# Patient Record
Sex: MALE | Race: WHITE | NOT HISPANIC OR LATINO | Employment: OTHER | URBAN - METROPOLITAN AREA
[De-identification: names, ages, dates, MRNs, and addresses within clinical notes are randomized per-mention and may not be internally consistent; named-entity substitution may affect disease eponyms.]

---

## 2017-02-18 DIAGNOSIS — I10 HYPERTENSION GOAL BP (BLOOD PRESSURE) < 140/90: ICD-10-CM

## 2017-02-20 NOTE — TELEPHONE ENCOUNTER
Lisinopril  Last Written Prescription Date: 12/5/16  Last Fill Quantity: 90, # refills: 0  Last Office Visit with [facility], [facility] or [facility] prescribing provider: 11/14/16 Dr. Brunson         Potassium   Date Value Ref Range Status   11/10/2014 4.4 3.4 - 5.3 mmol/L Final     Creatinine   Date Value Ref Range Status   11/10/2014 1.10 0.66 - 1.25 mg/dL Final     BP Readings from Last 3 Encounters:   11/14/16 138/80   07/29/15 137/75   12/17/14 128/80     SHAMA Eric (R)

## 2017-02-21 RX ORDER — LISINOPRIL 10 MG/1
TABLET ORAL
Qty: 90 TABLET | Refills: 0 | Status: SHIPPED | OUTPATIENT
Start: 2017-02-21 | End: 2017-05-24

## 2017-03-24 ENCOUNTER — TRANSFERRED RECORDS (OUTPATIENT)
Dept: HEALTH INFORMATION MANAGEMENT | Facility: CLINIC | Age: 67
End: 2017-03-24

## 2017-03-24 LAB
ALT SERPL-CCNC: 23 U/L (ref 9–46)
AST SERPL-CCNC: 20 U/L (ref 10–35)
CREAT SERPL-MCNC: 1.18 MG/DL (ref 0.7–1.25)
GFR SERPL CREATININE-BSD FRML MDRD: 65 ML/MIN/1.73M2
GLUCOSE SERPL-MCNC: 100 MG/DL (ref 65–99)
POTASSIUM SERPL-SCNC: 4.5 MMOL/L (ref 3.5–5.3)

## 2017-05-24 DIAGNOSIS — I10 HYPERTENSION GOAL BP (BLOOD PRESSURE) < 140/90: ICD-10-CM

## 2017-05-24 NOTE — TELEPHONE ENCOUNTER
lisinopril (PRINIVIL/ZESTRIL) 10 MG tablet      Last Written Prescription Date: 2/21/17  Last Fill Quantity: 90, # refills: 0  Last Office Visit with [facility], [facility] or [facility] prescribing provider: 11/14/16 Dr. Brunson         Potassium   Date Value Ref Range Status   11/10/2014 4.4 3.4 - 5.3 mmol/L Final     Creatinine   Date Value Ref Range Status   11/10/2014 1.10 0.66 - 1.25 mg/dL Final     BP Readings from Last 3 Encounters:   11/14/16 138/80   07/29/15 137/75   12/17/14 128/80

## 2017-05-26 RX ORDER — LISINOPRIL 10 MG/1
TABLET ORAL
Qty: 90 TABLET | Refills: 0 | Status: SHIPPED | OUTPATIENT
Start: 2017-05-26 | End: 2017-08-25

## 2017-05-26 NOTE — TELEPHONE ENCOUNTER
Routing refill request to provider for review/approval because:  Labs not current:  KYANNA RN

## 2017-05-26 NOTE — TELEPHONE ENCOUNTER
Call patient re:  He was going to forward some labs from his endocrinologist for our records. Please remind him to send.  ELVIA

## 2017-05-30 NOTE — TELEPHONE ENCOUNTER
This writer attempted to contact Cody on 05/30/17    Reason for call relay message and left message to return call. Cell # is [phone]    When patient calls back, please Relay message below from Dr. Brunson, (read verbatim) .        Dolores Oneil MA

## 2017-05-31 NOTE — TELEPHONE ENCOUNTER
This writer attempted to contact Cody on 05/31/17    Reason for call relay message and left message to return call.    When patient calls back, please Relay message from Dr. Brunson, (read verbatim) .        Dolores Oneil MA

## 2017-06-01 NOTE — TELEPHONE ENCOUNTER
LM for pt to call clinic.  3rd attempt, with no response.  Please advise.      Dolores REED, Patient Care

## 2017-08-25 DIAGNOSIS — I10 HYPERTENSION GOAL BP (BLOOD PRESSURE) < 140/90: ICD-10-CM

## 2017-08-25 NOTE — TELEPHONE ENCOUNTER
lisinopril (PRINIVIL/ZESTRIL) 10 MG tablet      Last Written Prescription Date: 5/26/17  Last Fill Quantity: 90, # refills: 0  Last Office Visit with [facility], [facility] or [facility] prescribing provider: 11/14/16 Dr. Brunson       Potassium   Date Value Ref Range Status   11/10/2014 4.4 3.4 - 5.3 mmol/L Final     Creatinine   Date Value Ref Range Status   11/10/2014 1.10 0.66 - 1.25 mg/dL Final     BP Readings from Last 3 Encounters:   11/14/16 138/80   07/29/15 137/75   12/17/14 128/80

## 2017-08-29 RX ORDER — LISINOPRIL 10 MG/1
TABLET ORAL
Qty: 30 TABLET | Refills: 0 | Status: SHIPPED | OUTPATIENT
Start: 2017-08-29 | End: 2017-10-03

## 2017-08-29 NOTE — TELEPHONE ENCOUNTER
Routing refill request to provider for review/approval because:  Kasey given x1 and patient did not follow up, please advise  Labs not current:  BMP    Loren Christianson, RN, BSN

## 2017-10-03 DIAGNOSIS — I10 HYPERTENSION GOAL BP (BLOOD PRESSURE) < 140/90: ICD-10-CM

## 2017-10-03 NOTE — TELEPHONE ENCOUNTER
lisinopril (PRINIVIL/ZESTRIL) 10 MG tablet      Last Written Prescription Date: 08/29/17  Last Fill Quantity: 30, # refills: 0  Last Office Visit with [facility] prescribing provider: 11/14/16 Dr. Brunson  Next 5 appointments (look out 90 days)     Oct 17, 2017  7:00 AM CDT   PHYSICAL with Martha Brunson MD   [facility]    [address] [place] [address] [phone]                   Potassium   Date Value Ref Range Status   11/10/2014 4.4 3.4 - 5.3 mmol/L Final     Creatinine   Date Value Ref Range Status   11/10/2014 1.10 0.66 - 1.25 mg/dL Final     BP Readings from Last 3 Encounters:   11/14/16 138/80   07/29/15 137/75   12/17/14 128/80

## 2017-10-05 RX ORDER — LISINOPRIL 10 MG/1
TABLET ORAL
Qty: 30 TABLET | Refills: 0 | Status: SHIPPED | OUTPATIENT
Start: 2017-10-05 | End: 2017-10-17

## 2017-10-17 ENCOUNTER — OFFICE VISIT (OUTPATIENT)
Dept: FAMILY MEDICINE | Facility: CLINIC | Age: 67
End: 2017-10-17
Payer: COMMERCIAL

## 2017-10-17 VITALS
TEMPERATURE: 98.3 F | HEART RATE: 79 BPM | HEIGHT: 71 IN | OXYGEN SATURATION: 93 % | WEIGHT: 232.2 LBS | BODY MASS INDEX: 32.51 KG/M2 | SYSTOLIC BLOOD PRESSURE: 134 MMHG | DIASTOLIC BLOOD PRESSURE: 68 MMHG

## 2017-10-17 DIAGNOSIS — Z87.891 FORMER SMOKER: ICD-10-CM

## 2017-10-17 DIAGNOSIS — R73.01 ELEVATED FASTING BLOOD SUGAR: ICD-10-CM

## 2017-10-17 DIAGNOSIS — Z00.00 ROUTINE GENERAL MEDICAL EXAMINATION AT A HEALTH CARE FACILITY: Primary | ICD-10-CM

## 2017-10-17 DIAGNOSIS — Z12.11 SCREEN FOR COLON CANCER: ICD-10-CM

## 2017-10-17 DIAGNOSIS — J43.9 PULMONARY EMPHYSEMA, UNSPECIFIED EMPHYSEMA TYPE (H): ICD-10-CM

## 2017-10-17 DIAGNOSIS — I10 HYPERTENSION GOAL BP (BLOOD PRESSURE) < 140/90: ICD-10-CM

## 2017-10-17 DIAGNOSIS — R79.81 BORDERLINE LOW OXYGEN SATURATION LEVEL: ICD-10-CM

## 2017-10-17 LAB
FEF 25/75: NORMAL
FEV-1: NORMAL
FEV1/FVC: NORMAL
FVC: NORMAL

## 2017-10-17 PROCEDURE — 94010 BREATHING CAPACITY TEST: CPT | Performed by: FAMILY MEDICINE

## 2017-10-17 PROCEDURE — 99396 PREV VISIT EST AGE 40-64: CPT | Mod: 25 | Performed by: FAMILY MEDICINE

## 2017-10-17 RX ORDER — LISINOPRIL 10 MG/1
TABLET ORAL
Qty: 90 TABLET | Refills: 3 | Status: SHIPPED | OUTPATIENT
Start: 2017-10-17 | End: 2018-10-19

## 2017-10-17 NOTE — MR AVS SNAPSHOT
After Visit Summary   10/17/2017    Cody Jose    MRN: [id number]           Patient Information     Date Of Birth          1950        Visit Information        Provider Department      10/17/2017 10:40 AM Martha Brunson MD [facility]        Today's Diagnoses     Routine general medical examination at a health care facility    -  1    Hypertension goal BP (blood pressure) < 140/90        Screen for colon cancer        Former smoker        Borderline low oxygen saturation level          Care Instructions    If you notice worsening shortness of breath, schedule follow up appointment. At that time, we will consider treatment with inhalers (Spiriva)     Preventive Health Recommendations  Male Ages 50 - 64    Yearly exam:             See your health care provider every year in order to  o   Review health changes.   o   Discuss preventive care.    o   Review your medicines if your doctor has prescribed any.     Have a cholesterol test every 5 years, or more frequently if you are at risk for high cholesterol/heart disease.     Have a diabetes test (fasting glucose) every three years. If you are at risk for diabetes, you should have this test more often.     Have a colonoscopy at age 50, or have a yearly FIT test (stool test). These exams will check for colon cancer.      Talk with your health care provider about whether or not a prostate cancer screening test (PSA) is right for you.    You should be tested each year for STDs (sexually transmitted diseases), if you re at risk.     Shots: Get a flu shot each year. Get a tetanus shot every 10 years.     Nutrition:    Eat at least 5 servings of fruits and vegetables daily.     Eat whole-grain bread, whole-wheat pasta and brown rice instead of white grains and rice.     Talk to your provider about Calcium and Vitamin D.     Lifestyle    Exercise for at least 150 minutes a week (30 minutes a day, 5 days a week). This will help you  control your weight and prevent disease.     Limit alcohol to one drink per day.     No smoking.     Wear sunscreen to prevent skin cancer.     See your dentist every six months for an exam and cleaning.     See your eye doctor every 1 to 2 years.            Follow-ups after your visit        Additional Services     GASTROENTEROLOGY ADULT REF PROCEDURE ONLY       Last Lab Result: Creatinine (mg/dL)       Date                     Value                 11/10/2014               1.10             ----------  Body mass index is 32.32 kg/(m^2).     Needed:  No  Language:  English    Patient will be contacted to schedule procedure.     Please be aware that coverage of these services is subject to the terms and limitations of your health insurance plan.  Call member services at your health plan with any benefit or coverage questions.  Any procedures must be performed at a [facility] facility OR coordinated by your clinic's referral office.    Please bring the following with you to your appointment:    (1) Any X-Rays, CTs or MRIs which have been performed.  Contact the facility where they were done to arrange for  prior to your scheduled appointment.    (2) List of current medications   (3) This referral request   (4) Any documents/labs given to you for this referral                  Who to contact     If you have questions or need follow up information about today's clinic visit or your schedule please contact [facility] directly at [phone].  Normal or non-critical lab and imaging results will be communicated to you by MyChart, letter or phone within 4 business days after the clinic has received the results. If you do not hear from us within 7 days, please contact the clinic through MyChart or phone. If you have a critical or abnormal lab result, we will notify you by phone as soon as possible.  Submit refill requests through [employer] or call your pharmacy and they will forward the refill  "request to us. Please allow 3 business days for your refill to be completed.          Additional Information About Your Visit        [employer] Information     [employer] gives you secure access to your electronic health record. If you see a primary care provider, you can also send messages to your care team and make appointments. If you have questions, please call your primary care clinic.  If you do not have a primary care provider, please call [phone] and they will assist you.        Care EveryWhere ID     This is your Care EveryWhere ID. This could be used by other organizations to access your [facility] medical records  [id number]        Your Vitals Were     Pulse Temperature Height Pulse Oximetry BMI (Body Mass Index)       79 98.3  F (36.8  C) (Oral) 1.805 m (5' 11.08\") 93% 32.32 kg/m2        Blood Pressure from Last 3 Encounters:   10/17/17 134/68   11/14/16 138/80   07/29/15 137/75    Weight from Last 3 Encounters:   10/17/17 105.3 kg (232 lb 3.2 oz)   11/14/16 109.2 kg (240 lb 12.8 oz)   07/29/15 106.5 kg (234 lb 12.8 oz)              We Performed the Following     GASTROENTEROLOGY ADULT REF PROCEDURE ONLY     Spirometry, Breathing Capacity: Normal Order, Clinic Performed          Today's Medication Changes          These changes are accurate as of: 10/17/17 12:18 PM.  If you have any questions, ask your nurse or doctor.               These medicines have changed or have updated prescriptions.        Dose/Directions    lisinopril 10 MG tablet   Commonly known as:  PRINIVIL/ZESTRIL   This may have changed:  See the new instructions.   Used for:  Hypertension goal BP (blood pressure) < 140/90   Changed by:  Martha Brunson MD        TAKE 1 TABLET (10 MG) BY MOUTH DAILY   Quantity:  90 tablet   Refills:  3            Where to get your medicines      These medications were sent to [facility]/pharmacy #[id number] [address] AT [place] [address]  [address] [address]     " Phone:  [phone]     lisinopril 10 MG tablet                Primary Care Provider Office Phone # Fax #    Martha Brunson [phone] [address]  [facility] [address]        Equal Access to Services     AMADOBOBO RAMA : Hadii aad ku hadasho Soomaali, waaxda luqadaha, qaybta kaalmada adeegyada, waxay niin hayraudeln adehan barbour laSpraudelkimberly lombardi. So [facility] [phone].    ATENCIÓN: Si habla español, tiene a arreguin disposición servicios gratuitos de asistencia lingüística. Llame al [phone].    We comply with applicable federal civil rights laws and [place] laws. We do not discriminate on the basis of race, color, national origin, age, disability, sex, sexual orientation, or gender identity.            Thank you!     Thank you for choosing [facility]  for your care. Our goal is always to provide you with excellent care. Hearing back from our patients is one way we can continue to improve our services. Please take a few minutes to complete the written survey that you may receive in the mail after your visit with us. Thank you!             Your Updated Medication List - Protect others around you: Learn how to safely use, store and throw away your medicines at www.disposemymeds.org.          This list is accurate as of: 10/17/17 12:18 PM.  Always use your most recent med list.                   Brand Name Dispense Instructions for use Diagnosis    anastrozole 1 MG tablet    ARIMIDEX     TAKE 1/2 TABLET BY MOUTH TWICE WEEKLY        lisinopril 10 MG tablet    PRINIVIL/ZESTRIL    90 tablet    TAKE 1 TABLET (10 MG) BY MOUTH DAILY    Hypertension goal BP (blood pressure) < 140/90       MULTIVITAL PO      daily        TESTOSTERONE IM      injection twice a week - 0.25 mL bid

## 2017-10-17 NOTE — PROGRESS NOTES
SUBJECTIVE:   CC: Cody Jose is an 66 year old male who presents for preventative health visit.     Physical   Annual:     Getting at least 3 servings of Calcium per day::  Yes    Bi-annual eye exam::  Yes    Dental care twice a year::  Yes    Diet::  Regular (no restrictions)    Frequency of exercise::  4-5 days/week    Duration of exercise::  30-45 minutes    Taking medications regularly::  Yes    Medication side effects::  None    Additional concerns today::  No  walking for exercise.      Hypertension Follow-up  Takes lisinopril 10 mg qd    Outpatient blood pressures are being checked at home.  Results are 130/80s.    Low Salt Diet: no added salt    Bilateral hearing loss   He wears hearing aides bilaterally with significant improvement in hearing. He has mild tinnitus.     Borderline Low oxygen saturation   SpO2 of 93% today. Historically between 93-96%. Shortness of breath with exertion. He does not wake up at night with shortness of breath. He quit smoking in his 40s, and had a 10 year 0.5 ppd smoking history. Significant smoke exposure in childhood.     Urinary symptoms  Nocturia x1-2. He feels he empties bladder with urination. Denies hinderance to his lifestyle.    Snoring   His wife has mentioned excessive snoring at night. He has had sleep study done which was negative for sleep apnea approximately 8 years ago. He and his wife have been sleeping in separate beds.      Additional information:  Follows with dermatology yearly   Elevated hemoglobin noted on outside labs reviewed with patient.     Colonoscopy in 2007 was normal. Due for repeat colonoscopy this year.     Today's PHQ-2 Score: PHQ-2 ( 1999 Pfizer) 10/17/2017   Q1: Little interest or pleasure in doing things 0   Q2: Feeling down, depressed or hopeless 0   PHQ-2 Score 0       Abuse: Current or Past(Physical, Sexual or Emotional)- No  Do you feel safe in your environment - Yes    Social History   Substance Use Topics     Smoking status: Former  Smoker     Types: Cigarettes     Smokeless tobacco: Never Used      Comment: 0.5 ppd prior to 5/2000     Alcohol use No     The patient does not drink >3 drinks per day nor >7 drinks per week.    Last PSA:   PSA   Date Value Ref Range Status   03/25/2015 0.6 ng/mL Final       Reviewed orders with patient. Reviewed health maintenance and updated orders accordingly - Yes  BP Readings from Last 3 Encounters:   10/17/17 134/68   11/14/16 138/80   07/29/15 137/75    Wt Readings from Last 3 Encounters:   10/17/17 105.3 kg (232 lb 3.2 oz)   11/14/16 109.2 kg (240 lb 12.8 oz)   07/29/15 106.5 kg (234 lb 12.8 oz)              Reviewed and updated as needed this visit by clinical staffTobacco  Allergies  Meds  Problems  Med Hx  Surg Hx  Fam Hx  Soc Hx          Reviewed and updated as needed this visit by Provider  Tobacco  Allergies  Meds  Problems  Med Hx  Surg Hx  Fam Hx  Soc Hx         Past Medical History:   Diagnosis Date     Benign neoplasm of colon     Due for colonoscopy in 2009     CARDIOVASCULAR SCREENING; LDL GOAL LESS THAN 130 10/31/2010     Enlarged prostate      Ganglion, unspecified     ganglion cyst left ankle      Hypertension goal BP (blood pressure) < 140/90 10/21/2014     Hypertrophy of prostate without urinary obstruction and other lower urinary tract symptoms (LUTS)      Hypotestosteronism      Psychosexual dysfunction with inhibited sexual excitement      Tobacco use disorder     smoked 10 years age [age]      Past Surgical History:   Procedure Laterality Date     C APPENDECTOMY  9 yrs old     C NONSPECIFIC PROCEDURE  11/29/2005    Ganglion Cyst Removal - Left Ankle     COLONOSCOPY  10/11/2007       ROS:  10 point ROS of systems including Constitutional, Eyes, Respiratory, Cardiovascular, Gastroenterology, Genitourinary, Integumentary, Muscularskeletal, Psychiatric were all negative except for pertinent positives noted in my HPI.     MS: POSITIVE for high arches - wears orthotic inserts,  "no barefoot walking      This document serves as a record of the services and decisions personally performed and made by Martha Brunson MD. It was created on her behalf by Dian Reyes, a trained medical scribe. The creation of this document is based on the provider's statements to the medical scribe.  Dian Reyes 11:32 AM October 17, 2017    OBJECTIVE:   /68 (BP Location: Right arm, Patient Position: Sitting, Cuff Size: Adult Regular)  Pulse 79  Temp 98.3  F (36.8  C) (Oral)  Ht 1.805 m (5' 11.08\")  Wt 105.3 kg (232 lb 3.2 oz)  SpO2 93%  BMI 32.32 kg/m2    EXAM:  GENERAL: healthy, alert and no distress  EYES: Eyes grossly normal to inspection, PERRL and conjunctivae and sclerae normal  HENT: ear canals and TM's normal, nose and mouth without ulcers or lesions  NECK: no adenopathy, no asymmetry, masses, or scars and thyroid normal to palpation  RESP: lungs clear to auscultation - no rales, rhonchi or wheezes  CV: regular rate and rhythm, normal S1 S2, no S3 or S4, no murmur, click or rub, no peripheral edema and peripheral pulses strong  ABDOMEN: soft, nontender, no hepatosplenomegaly, no masses and bowel sounds normal  BACK: No CVA tenderness   RECTAL: normal sphincter tone, no rectal masses, prostate enlarged, smooth, nontender without nodules or masses  MS: High arches bilaterally, no gross musculoskeletal defects noted, no edema  SKIN: calloused heels bilaterally, no suspicious lesions or rashes  NEURO: Normal strength and tone, mentation intact and speech normal  PSYCH: mentation appears normal, affect normal/bright    ASSESSMENT/PLAN:   1. Routine general medical examination at a health care facility  Stable health. No labs today as patient had labs done at another facility. Reviewed labs with patient.   Borderline blood sugar.  Normal kidney function.  Elevated HGB    2. Hypertension goal BP (blood pressure) < 140/90  Blood pressure is controlled. Continue current meds without dose " "change. 90 day supply sent.   - lisinopril (PRINIVIL/ZESTRIL) 10 MG tablet; TAKE 1 TABLET (10 MG) BY MOUTH DAILY  Dispense: 90 tablet; Refill: 3    3. Screen for colon cancer  Last colonoscopy was in 2007.   - GASTROENTEROLOGY ADULT REF PROCEDURE ONLY    4. Former smoker, 5. Borderline low oxygen saturation level  SpO2 of 93% today. Patient has 10 year 0.5ppd history of smoking. He quit in his 40s. He has a significant smoke exposure in childhood. He is asymptomatic, and is able to exercise regularly. Spirometry today showed moderate airway obstruction. Discussed treatment with inhalers - recommended spiriva.   Patient desires to continue with conservative treatments of increased cardiovascular exercise. If worsening shortness of breath, he will follow up in clinic. At that time we will discuss start of inhalers further. Otherwise, follow up in 1 year with repeat spirometry.   - Spirometry, Breathing Capacity: Normal Order, Clinic Performed    COUNSELING:   Reviewed preventive health counseling, as reflected in patient instructions       Regular exercise       Healthy diet/nutrition       Immunizations    Declined: Influenza due to Conscientious objector             Colon cancer screening       Prostate cancer screening       reports that he has quit smoking. His smoking use included Cigarettes. He has never used smokeless tobacco.  Estimated body mass index is 32.32 kg/(m^2) as calculated from the following:    Height as of this encounter: 1.805 m (5' 11.08\").    Weight as of this encounter: 105.3 kg (232 lb 3.2 oz).   Weight management plan: Discussed healthy diet and exercise guidelines and patient will follow up in 6 months in clinic to re-evaluate.    Counseling Resources:  ATP IV Guidelines  Pooled Cohorts Equation Calculator  FRAX Risk Assessment  ICSI Preventive Guidelines  Dietary Guidelines for Americans, 2010  USDA's MyPlate  ASA Prophylaxis  Lung CA Screening    The information in this document, created " by the medical scribe for me, accurately reflects the services I personally performed and the decisions made by me. I have reviewed and approved this document for accuracy prior to leaving the patient care area.  October 17, 2017 12:09 PM    Martha Brunson MD  [facility]

## 2017-10-17 NOTE — PROGRESS NOTES
"Estimated body mass index is 32.32 kg/(m^2) as calculated from the following:    Height as of this encounter: 1.805 m (5' 11.08\").    Weight as of this encounter: 105.3 kg (232 lb 3.2 oz).   Weight management plan: Discussed healthy diet and exercise guidelines and patient will follow up in 6 months in clinic to re-evaluate.    Counseling Resources:  ATP IV Guidelines  Pooled Cohorts Equation Calculator  FRAX Risk Assessment  ICSI Preventive Guidelines  Dietary Guidelines for Americans, 2010  USDA's MyPlate  ASA Prophylaxis  Lung CA Screening    Martha Brunson MD  [facility]  "

## 2017-10-17 NOTE — NURSING NOTE
"Chief Complaint   Patient presents with     Physical     Not fasting     Refill Request       Initial /68 (BP Location: Right arm, Patient Position: Sitting, Cuff Size: Adult Regular)  Pulse 79  Temp 98.3  F (36.8  C) (Oral)  Ht 1.805 m (5' 11.08\")  Wt 105.3 kg (232 lb 3.2 oz)  SpO2 93%  BMI 32.32 kg/m2 Estimated body mass index is 32.32 kg/(m^2) as calculated from the following:    Height as of this encounter: 1.805 m (5' 11.08\").    Weight as of this encounter: 105.3 kg (232 lb 3.2 oz).  Medication Reconciliation: complete   Abigail Godoy MA      "

## 2017-10-17 NOTE — LETTER
My COPD Action Plan     Name: Cody Jose    YOB: 1950   Date: 10/17/2017    My doctor: Martha Brunson MD   My clinic: [address] [address] [phone]  My Controller Medicine: none   Dose: declined use     My Rescue Medicine: none   Dose:      My Flare Up Medicine: none   Dose:      My COPD Severity: Moderate = FeV1 < 79% -50%      Use of Oxygen: Oxygen Not Prescribed      Make sure you've had your pneumonia   vaccines.          GREEN ZONE       Doing well today      Usual level of activity and exercise    Usual amount of cough and mucus    No shortness of breath    Usual level of health (thinking clearly, sleeping well, feel like eating) Actions:      Take daily medicines    Use oxygen as prescribed    Follow regular exercise and diet plan    Avoid cigarette smoke and other irritants that harm the lungs           YELLOW ZONE          Having a bad day or flare up      Short of breath more than usual    A lot more sputum (mucus) than usual    Sputum looks yellow, green, tan, brown or bloody    More coughing or wheezing    Fever or chills    Less energy; trouble completing activities    Trouble thinking or focusing    Using quick relief inhaler or nebulizer more often    Poor sleep; symptoms wake me up    Do not feel like eating Actions:      Get plenty of rest    Take daily medicines    Use quick relief inhaler every 4-6 hours    If you use oxygen, call you doctor to see if you should adjust your oxygen    Do breathing exercises or other things to help you relax    Let a loved one, friend or neighbor know you are feeling worse    Call your care team if you have 2 or more symptoms.  Start taking steroids or antibiotics if directed by your care team           RED ZONE       Need medical care now      Severe shortness of breath (feel you can't breathe)    Fever, chills    Not enough breath to do any activity    Trouble coughing up  mucus, walking or talking    Blood in mucus    Frequent coughing   Rescue medicines are not working    Not able to sleep because of breathing    Feel confused or drowsy    Chest pain    Actions:      Call your health care team.  If you cannot reach your care team, call 911 or go to the emergency room.        Electronically signed by: Martha Brunson, October 17, 2017  Annual Reminders:  Meet with Care Team, Flu Shot every Fall  Pharmacy: [facility]/PHARMACY #[id number] [place], [place] - [address] AT [address]

## 2017-10-17 NOTE — PATIENT INSTRUCTIONS
If you notice worsening shortness of breath, schedule follow up appointment. At that time, we will consider treatment with inhalers (Spiriva)     Preventive Health Recommendations  Male Ages 50   64    Yearly exam:             See your health care provider every year in order to  o   Review health changes.   o   Discuss preventive care.    o   Review your medicines if your doctor has prescribed any.     Have a cholesterol test every 5 years, or more frequently if you are at risk for high cholesterol/heart disease.     Have a diabetes test (fasting glucose) every three years. If you are at risk for diabetes, you should have this test more often.     Have a colonoscopy at age 50, or have a yearly FIT test (stool test). These exams will check for colon cancer.      Talk with your health care provider about whether or not a prostate cancer screening test (PSA) is right for you.    You should be tested each year for STDs (sexually transmitted diseases), if you re at risk.     Shots: Get a flu shot each year. Get a tetanus shot every 10 years.     Nutrition:    Eat at least 5 servings of fruits and vegetables daily.     Eat whole-grain bread, whole-wheat pasta and brown rice instead of white grains and rice.     Talk to your provider about Calcium and Vitamin D.     Lifestyle    Exercise for at least 150 minutes a week (30 minutes a day, 5 days a week). This will help you control your weight and prevent disease.     Limit alcohol to one drink per day.     No smoking.     Wear sunscreen to prevent skin cancer.     See your dentist every six months for an exam and cleaning.     See your eye doctor every 1 to 2 years.

## 2017-12-15 ENCOUNTER — HOSPITAL ENCOUNTER (OUTPATIENT)
Facility: AMBULATORY SURGERY CENTER | Age: 67
Discharge: HOME | End: 2017-12-15
Attending: SPECIALIST | Admitting: SPECIALIST
Payer: COMMERCIAL

## 2017-12-15 ENCOUNTER — SURGERY (OUTPATIENT)
Age: 67
End: 2017-12-15

## 2017-12-15 VITALS
TEMPERATURE: 97.8 F | DIASTOLIC BLOOD PRESSURE: 77 MMHG | HEART RATE: 76 BPM | OXYGEN SATURATION: 95 % | SYSTOLIC BLOOD PRESSURE: 122 MMHG | RESPIRATION RATE: 16 BRPM

## 2017-12-15 LAB — COLONOSCOPY: NORMAL

## 2017-12-15 PROCEDURE — G8918 PT W/O PREOP ORDER IV AB PRO: HCPCS

## 2017-12-15 PROCEDURE — 88305 TISSUE EXAM BY PATHOLOGIST: CPT | Performed by: SPECIALIST

## 2017-12-15 PROCEDURE — G8907 PT DOC NO EVENTS ON DISCHARG: HCPCS

## 2017-12-15 PROCEDURE — 45385 COLONOSCOPY W/LESION REMOVAL: CPT

## 2017-12-15 RX ORDER — LIDOCAINE 40 MG/G
CREAM TOPICAL
Status: DISCONTINUED | OUTPATIENT
Start: 2017-12-15 | End: 2017-12-16 | Stop reason: HOSPADM

## 2017-12-15 RX ORDER — FENTANYL CITRATE 50 UG/ML
INJECTION, SOLUTION INTRAMUSCULAR; INTRAVENOUS PRN
Status: DISCONTINUED | OUTPATIENT
Start: 2017-12-15 | End: 2017-12-15 | Stop reason: HOSPADM

## 2017-12-15 RX ORDER — ONDANSETRON 2 MG/ML
4 INJECTION INTRAMUSCULAR; INTRAVENOUS
Status: DISCONTINUED | OUTPATIENT
Start: 2017-12-15 | End: 2017-12-16 | Stop reason: HOSPADM

## 2017-12-15 RX ADMIN — FENTANYL CITRATE 50 MCG: 50 INJECTION, SOLUTION INTRAMUSCULAR; INTRAVENOUS at 07:36

## 2017-12-15 RX ADMIN — FENTANYL CITRATE 50 MCG: 50 INJECTION, SOLUTION INTRAMUSCULAR; INTRAVENOUS at 07:38

## 2017-12-15 NOTE — BRIEF OP NOTE
[facility] Brief Operative Note    Pre-operative diagnosis: Screen for colon cancer  BMI: 32.32  Pharm: [place] [id number]  [phone]  Ref: Dr. Brunson  Ins: [employer]   Post-operative diagnosis two polyps     Procedure: Procedure(s):  Screen for colon cancer  BMI: 32.32  Pharm: [address]  [phone]  Ref: Dr. Brunson  Ins: [employer] - Wound Class: II-Clean Contaminated   - Wound Class: II-Clean Contaminated   Surgeon(s): Surgeon(s) and Role:     * Roman Stein MD - Primary   Estimated blood loss: * No values recorded between 12/15/2017  7:35 AM and 12/15/2017  8:04 AM *    Specimens:   ID Type Source Tests Collected by Time Destination   A : ascending colon polyp x1 /cold snared Polyp Large Intestine, Right/Ascending SURGICAL PATHOLOGY EXAM Roman Stein MD 12/15/2017  7:49 AM    B : descending colon polyp x1/cold snared Polyp Large Intestine, Left/Descending SURGICAL PATHOLOGY EXAM Roman Stein MD 12/15/2017  7:55 AM       Findings: Please see ProVation procedure note in Chart Review

## 2017-12-15 NOTE — H&P
Pre-Endoscopy History and Physical     Cody Jose MRN# [id number]   YOB: 1950 Age: 67 year old     Date of Procedure: 12/15/2017  Primary care provider: Martha Brunson  Type of Endoscopy: Colonoscopy with possible biopsy, possible polypectomy  Reason for Procedure: screening  Type of Anesthesia Anticipated: Conscious Sedation    HPI:    Cody is a 67 year old male who will be undergoing the above procedure.      A history and physical has been performed. The patient's medications and allergies have been reviewed. The risks and benefits of the procedure and the sedation options and risks were discussed with the patient.  All questions were answered and informed consent was obtained.      He denies a personal or family history of anesthesia complications or bleeding disorders.     Patient Active Problem List   Diagnosis     Urinary hesitancy     Skin lesion     Numbness of toes     Mechanical low back pain     Allergy to shellfish     Hearing loss     Hypertrophy of prostate without urinary obstruction     Encounter for removal of sutures     CARDIOVASCULAR SCREENING; LDL GOAL LESS THAN 130     Hypotestosteronism     Hypertension goal BP (blood pressure) < 140/90     Non morbid obesity due to excess calories     Elevated fasting blood sugar     moderate obstructive lung (H)        Past Medical History:   Diagnosis Date     Benign neoplasm of colon     Due for colonoscopy in 2009     CARDIOVASCULAR SCREENING; LDL GOAL LESS THAN 130 10/31/2010     Ganglion, unspecified     ganglion cyst left ankle      Hypertension goal BP (blood pressure) < 140/90 10/21/2014     Hypertrophy of prostate without urinary obstruction and other lower urinary tract symptoms (LUTS)      Hypotestosteronism      Psychosexual dysfunction with inhibited sexual excitement      Tobacco use disorder     smoked 10 years age [age]        Past Surgical History:   Procedure Laterality Date     C APPENDECTOMY  9 yrs old     C  "NONSPECIFIC PROCEDURE  11/29/2005    Ganglion Cyst Removal - Left Ankle     COLONOSCOPY  10/11/2007       Social History   Substance Use Topics     Smoking status: Former Smoker     Types: Cigarettes     Smokeless tobacco: Former User     Quit date: 5/1/2000      Comment: 0.5 ppd prior to 5/2000     Alcohol use No       Family History   Problem Relation Age of Onset     CEREBROVASCULAR DISEASE Mother      aneursym     CANCER Mother      lung, smoker     Alcohol/Drug Father      etoh, tob     Hypertension Father      Prostate Cancer Father      Respiratory Father      COPD     Cardiovascular Father      carotid artery surgery- stint placed 6 years ago     CARLOS Maternal Grandmother      CANCER Maternal Grandfather      lung     CEREBROVASCULAR DISEASE Paternal Grandmother      C.A.D. Paternal Grandfather      Neurologic Disorder Brother      hydrocephalus       Prior to Admission medications    Medication Sig Start Date End Date Taking? Authorizing Provider   lisinopril (PRINIVIL/ZESTRIL) 10 MG tablet TAKE 1 TABLET (10 MG) BY MOUTH DAILY 10/17/17  Yes Martha Brunson MD   TESTOSTERONE IM injection twice a week - 0.25 mL bid   Yes Reported, Patient   MULTIVITAL OR daily   Yes Reported, Patient       Allergies   Allergen Reactions     Shellfish Allergy Anaphylaxis        REVIEW OF SYSTEMS:   5 point ROS negative except as noted above in HPI, including Gen., Resp., CV, GI &  system review.    PHYSICAL EXAM:   BP (!) 170/95  Pulse 76  Temp 97.8  F (36.6  C) (Temporal)  Resp 16  SpO2 95% Estimated body mass index is 32.32 kg/(m^2) as calculated from the following:    Height as of 10/17/17: 1.805 m (5' 11.08\").    Weight as of 10/17/17: 105.3 kg (232 lb 3.2 oz).   GENERAL APPEARANCE: alert, and oriented  MENTAL STATUS: alert  AIRWAY EXAM: Mallampatti Class II (visualization of the soft palate, fauces, and uvula)  RESP: lungs clear to auscultation - no rales, rhonchi or wheezes  CV: regular rates and " rhythm  DIAGNOSTICS:    Not indicated    IMPRESSION   ASA Class 2 - Mild systemic disease    PLAN:   Plan for Colonoscopy with possible biopsy, possible polypectomy. We discussed the risks, benefits and alternatives and the patient wished to proceed.    The above has been forwarded to the consulting provider.      Signed Electronically by: Roman Stein  December 15, 2017

## 2017-12-19 LAB — COPATH REPORT: NORMAL

## 2018-10-09 ENCOUNTER — TRANSFERRED RECORDS (OUTPATIENT)
Dept: HEALTH INFORMATION MANAGEMENT | Facility: CLINIC | Age: 68
End: 2018-10-09

## 2018-10-09 LAB
ALT SERPL-CCNC: 24 U/L (ref 9–46)
AST SERPL-CCNC: 22 U/L (ref 10–35)
CREAT SERPL-MCNC: 1.09 MG/DL (ref 0.7–1.25)
GFR SERPL CREATININE-BSD FRML MDRD: 71 ML/MIN/1.73M2
GLUCOSE SERPL-MCNC: 110 MG/DL (ref 65–99)
HBA1C MFR BLD: 5.4 % (ref 0–5.7)
POTASSIUM SERPL-SCNC: 4.8 MMOL/L (ref 3.5–5.3)

## 2018-10-19 DIAGNOSIS — I10 HYPERTENSION GOAL BP (BLOOD PRESSURE) < 140/90: ICD-10-CM

## 2018-10-19 NOTE — TELEPHONE ENCOUNTER
"Requested Prescriptions   Pending Prescriptions Disp Refills     lisinopril (PRINIVIL/ZESTRIL) 10 MG tablet [Pharmacy Med Name: LISINOPRIL 10 MG TABLET]  Last Written Prescription Date:  10/17/17  Last Fill Quantity: 90 tablet,  # refills: 3   Last office visit: 10/17/2017 with prescribing provider:  Dr. Brunson   Future Office Visit:   90 tablet 3     Sig: TAKE 1 TABLET (10 MG) BY MOUTH DAILY    ACE Inhibitors (Including Combos) Protocol Failed    10/19/2018  2:09 AM       Failed - Recent (12 mo) or future (30 days) visit within the authorizing provider's specialty    Patient had office visit in the last 12 months or has a visit in the next 30 days with authorizing provider or within the authorizing provider's specialty.  See \"Patient Info\" tab in inbasket, or \"Choose Columns\" in Meds & Orders section of the refill encounter.             Failed - Normal serum creatinine on file in past 12 months    Recent Labs   Lab Test 03/24/17 06/25/13   0806   CR  1.18   < >   --    CRPOC   --    --   1.0    < > = values in this interval not displayed.            Failed - Normal serum potassium on file in past 12 months    Recent Labs   Lab Test 03/24/17   POTASSIUM  4.5            Passed - Blood pressure under 140/90 in past 12 months    BP Readings from Last 3 Encounters:   12/15/17 122/77   10/17/17 134/68   11/14/16 138/80                Passed - Patient is age 18 or older          "

## 2018-10-22 RX ORDER — LISINOPRIL 10 MG/1
10 TABLET ORAL DAILY
Qty: 30 TABLET | Refills: 0 | Status: SHIPPED | OUTPATIENT
Start: 2018-10-22 | End: 2018-11-17

## 2018-10-22 NOTE — TELEPHONE ENCOUNTER
Routing refill request to provider for review/approval because:  Last Office visit was over one year ago.  Christi Casey RN

## 2018-11-17 DIAGNOSIS — I10 HYPERTENSION GOAL BP (BLOOD PRESSURE) < 140/90: ICD-10-CM

## 2018-11-19 NOTE — TELEPHONE ENCOUNTER
"Requested Prescriptions   Pending Prescriptions Disp Refills     lisinopril (PRINIVIL/ZESTRIL) 10 MG tablet [Pharmacy Med Name: LISINOPRIL 10 MG TABLET] 30 tablet 0     Sig: TAKE 1 TABLET (10 MG) BY MOUTH DAILY. APPOINTMENT NEEDED FOR ADDITIONAL REFILLS.    ACE Inhibitors (Including Combos) Protocol Failed    11/17/2018 12:07 PM       Failed - Recent (12 mo) or future (30 days) visit within the authorizing provider's specialty    Patient had office visit in the last 12 months or has a visit in the next 30 days with authorizing provider or within the authorizing provider's specialty.  See \"Patient Info\" tab in inbasket, or \"Choose Columns\" in Meds & Orders section of the refill encounter.             Failed - Normal serum creatinine on file in past 12 months    Recent Labs   Lab Test 03/24/17 06/25/13   0806   CR  1.18   < >   --    CRPOC   --    --   1.0    < > = values in this interval not displayed.            Failed - Normal serum potassium on file in past 12 months    Recent Labs   Lab Test 03/24/17   POTASSIUM  4.5            Passed - Blood pressure under 140/90 in past 12 months    BP Readings from Last 3 Encounters:   12/15/17 122/77   10/17/17 134/68   11/14/16 138/80                Passed - Patient is age 18 or older        lisinopril (PRINIVIL/ZESTRIL) 10 MG tablet  Last Written Prescription Date:  10/22/18  Last Fill Quantity: 30,  # refills: 0   Last office visit: 10/17/2017 with prescribing provider:  Dr. Brunson   Future Office Visit:      "

## 2018-11-20 RX ORDER — LISINOPRIL 10 MG/1
TABLET ORAL
Qty: 30 TABLET | Refills: 0 | Status: SHIPPED | OUTPATIENT
Start: 2018-11-20 | End: 2018-12-11 | Stop reason: ALTCHOICE

## 2018-11-20 NOTE — TELEPHONE ENCOUNTER
Routing refill request to provider for review/approval because:  Kasey given x1 and patient did not follow up  Marlene Ponce RN

## 2018-11-21 NOTE — TELEPHONE ENCOUNTER
Patient needs to be seen by Martha Brunson MD  (provider or resource)  Is there a time frame in which the patient should be seen Yes: within month  What does the patient need to be seen regarding annual, HTN   Does patient need to come fasting Yes  Phone: [phone] (home)  When workflow completed Close Encounter    Clinic: [facility] at [phone]    'Hi, this is (your name) I am calling from (provider's name) office. After reviewing your chart, (Provider's name) has indicated that you need an appointment to review (reason for visit). May I assist you in making this appointment? (Please contact us via [facility] or by phone at (Clinic name and Phone number) to schedule this appointment at your earliest convenience)'    Was first outreach attempted?No  If yes, the Second attempt due on:

## 2018-12-11 ENCOUNTER — OFFICE VISIT (OUTPATIENT)
Dept: FAMILY MEDICINE | Facility: CLINIC | Age: 68
End: 2018-12-11
Payer: MEDICARE

## 2018-12-11 ENCOUNTER — ANCILLARY PROCEDURE (OUTPATIENT)
Dept: GENERAL RADIOLOGY | Facility: CLINIC | Age: 68
End: 2018-12-11
Attending: FAMILY MEDICINE
Payer: MEDICARE

## 2018-12-11 VITALS
TEMPERATURE: 98.1 F | DIASTOLIC BLOOD PRESSURE: 84 MMHG | HEART RATE: 71 BPM | HEIGHT: 71 IN | RESPIRATION RATE: 18 BRPM | WEIGHT: 232 LBS | OXYGEN SATURATION: 95 % | BODY MASS INDEX: 32.48 KG/M2 | SYSTOLIC BLOOD PRESSURE: 163 MMHG

## 2018-12-11 DIAGNOSIS — M19.042 OSTEOARTHRITIS OF FINGER OF LEFT HAND: ICD-10-CM

## 2018-12-11 DIAGNOSIS — M79.645 PAIN OF FINGER OF LEFT HAND: ICD-10-CM

## 2018-12-11 DIAGNOSIS — R73.01 ELEVATED FASTING BLOOD SUGAR: ICD-10-CM

## 2018-12-11 DIAGNOSIS — R39.11 URINARY HESITANCY: ICD-10-CM

## 2018-12-11 DIAGNOSIS — Z87.891 PERSONAL HISTORY OF TOBACCO USE: ICD-10-CM

## 2018-12-11 DIAGNOSIS — I10 HYPERTENSION GOAL BP (BLOOD PRESSURE) < 140/90: ICD-10-CM

## 2018-12-11 DIAGNOSIS — Z00.00 MEDICARE ANNUAL WELLNESS VISIT, SUBSEQUENT: Primary | ICD-10-CM

## 2018-12-11 DIAGNOSIS — J43.9 PULMONARY EMPHYSEMA, UNSPECIFIED EMPHYSEMA TYPE (H): ICD-10-CM

## 2018-12-11 LAB
CHOLEST SERPL-MCNC: 168 MG/DL
HDLC SERPL-MCNC: 53 MG/DL
LDLC SERPL CALC-MCNC: 97 MG/DL
NONHDLC SERPL-MCNC: 115 MG/DL
TRIGL SERPL-MCNC: 88 MG/DL

## 2018-12-11 PROCEDURE — 99214 OFFICE O/P EST MOD 30 MIN: CPT | Mod: 25 | Performed by: FAMILY MEDICINE

## 2018-12-11 PROCEDURE — 73140 X-RAY EXAM OF FINGER(S): CPT | Mod: LT

## 2018-12-11 PROCEDURE — 80061 LIPID PANEL: CPT | Performed by: FAMILY MEDICINE

## 2018-12-11 PROCEDURE — G0438 PPPS, INITIAL VISIT: HCPCS | Performed by: FAMILY MEDICINE

## 2018-12-11 PROCEDURE — 36415 COLL VENOUS BLD VENIPUNCTURE: CPT | Performed by: FAMILY MEDICINE

## 2018-12-11 RX ORDER — TERAZOSIN 2 MG/1
2 CAPSULE ORAL AT BEDTIME
Qty: 30 CAPSULE | Refills: 1 | Status: SHIPPED | OUTPATIENT
Start: 2018-12-11 | End: 2019-02-09 | Stop reason: DRUGHIGH

## 2018-12-11 RX ORDER — NEEDLES, SAFETY 22GX1 1/2"
NEEDLE, DISPOSABLE MISCELLANEOUS
Refills: 1 | COMMUNITY
Start: 2018-10-01

## 2018-12-11 RX ORDER — ANASTROZOLE 1 MG/1
TABLET ORAL
Refills: 1 | COMMUNITY
Start: 2018-10-01 | End: 2020-10-14 | Stop reason: ALTCHOICE

## 2018-12-11 ASSESSMENT — MIFFLIN-ST. JEOR: SCORE: 1858.44

## 2018-12-11 NOTE — PATIENT INSTRUCTIONS
New Shingles Vaccine is recommended. Please visit the pharmacy or clinic if this is desired. Also, Pneumonia vaccine is recommended.      Vitamin D 1000IU to 2000IU daily is recommended.     Left finger xray. I will notify you of final results when received from radiology. I recommend wearing splint as discussed today     Labs today. I will notify you of results when I receive them.     Begin Hytrin 2 mg daily at night as directed at night instead of lisinopril for urinary hesitancy and hypertension. Remain off lisinopril.     Please return to clinic in one month for BP recheck with Medical Assistant. I will make further refills when BP reviewed.             Preventive Health Recommendations:     See your health care provider every year to    Review health changes.     Discuss preventive care.      Review your medicines if your doctor has prescribed any.    Talk with your health care provider about whether you should have a test to screen for prostate cancer (PSA).    Every 3 years, have a diabetes test (fasting glucose). If you are at risk for diabetes, you should have this test more often.    Every 5 years, have a cholesterol test. Have this test more often if you are at risk for high cholesterol or heart disease.     Every 10 years, have a colonoscopy. Or, have a yearly FIT test (stool test). These exams will check for colon cancer.    Talk to with your health care provider about screening for Abdominal Aortic Aneurysm if you have a family history of AAA or have a history of smoking.  Shots:     Get a flu shot each year.     Get a tetanus shot every 10 years.     Talk to your doctor about your pneumonia vaccines. There are now two you should receive - Pneumovax (PPSV 23) and Prevnar (PCV 13).    Talk to your pharmacist about a shingles vaccine.     Talk to your doctor about the hepatitis B vaccine.  Nutrition:     Eat at least 5 servings of fruits and vegetables each day.     Eat whole-grain bread, whole-wheat  pasta and brown rice instead of white grains and rice.     Get adequate Calcium and Vitamin D.   Lifestyle    Exercise for at least 150 minutes a week (30 minutes a day, 5 days a week). This will help you control your weight and prevent disease.     Limit alcohol to one drink per day.     No smoking.     Wear sunscreen to prevent skin cancer.     See your dentist every six months for an exam and cleaning.     See your eye doctor every 1 to 2 years to screen for conditions such as glaucoma, macular degeneration and cataracts.    Personalized Prevention Plan  You are due for the preventive services outlined below.  Your care team is available to assist you in scheduling these services.  If you have already completed any of these items, please share that information with your care team to update in your medical record.    Health Maintenance Due   Topic Date Due     Hepatitis C Screening  11/22/1970     Zoster (Chicken Pox) Vaccine (1 of 2) 11/22/2002     Discuss Advance Directive Planning  11/22/2007     Diptheria Tetanus Pertussis (DTAP/TDAP/TD) Vaccine (3 - Td) 01/29/2016     FALL RISK ASSESSMENT  11/22/2017     Pneumococcal Vaccine (1 of 2 - PCV13) 11/22/2017     Flu Vaccine (1) 09/01/2018     COPD ACTION PLAN Q1 YR  10/17/2018     Depression Assessment 2 - yearly  10/17/2018

## 2018-12-11 NOTE — PROGRESS NOTES
"  SUBJECTIVE:   Cody Jose is a 68 year old male who presents for Preventive Visit.      Are you in the first 12 months of your Medicare Part B coverage?  No    Physical Health:    In general, how would you rate your overall physical health? good    Outside of work, how many days during the week do you exercise? 2-3 days/week   In the mornings he uses treadmill or elliptical for 10 to 15 minutes and goes to the gym twice a week with toleration. No breathing issues.     Outside of work, approximately how many minutes a day do you exercise?30-45 minutes    If you drink alcohol do you typically have >3 drinks per day or >7 drinks per week? No    Do you usually eat at least 4 servings of fruit and vegetables a day, include whole grains & fiber and avoid regularly eating high fat or \"junk\" foods? NO    Do you have any problems taking medications regularly?  No    Do you have any side effects from medications? not applicable    Needs assistance for the following daily activities: no assistance needed    Which of the following safety concerns are present in your home?  none identified     Hearing impairment: Yes, wear hearing aids     In the past 6 months, have you been bothered by leaking of urine? no    Mental Health:    In general, how would you rate your overall mental or emotional health? good  PHQ-2 Score: 0    Do you feel safe in your environment? Yes    Do you have a Health Care Directive? Yes: Patient states has Advance Directive and will bring in a copy to clinic.    Additional concerns to address?  YES - see below, finger pain, HTN, BPH    Fall risk:  Fallen 2 or more times in the past year?: No  Any fall with injury in the past year?: No    Cognitive Screenin) Repeat 3 items (Leader, Season, Table)    2) Clock draw: NORMAL  3) 3 item recall: Recalls 3 objects  Results: 3 items recalled: COGNITIVE IMPAIRMENT LESS LIKELY    Mini-CogTM Copyright S [employer]. Licensed by the author for use in [employer] " Services; reprinted with permission (soglen@.[facility]). All rights reserved.      Do you have sleep apnea, excessive snoring or daytime drowsiness?: no    Hypertension   10 days ago stopped drinking coffee to see if coffee was correlated to hypertension and stopped taking lisinopril. blood pressure during this trial was 164/90. Discontinued lisinopril since 10 days ago.     He would like a combined medication for blood pressure medication and Hypertrophy of prostate without urinary obstruction. He stated that he has nocturia at least twice.   He tolerated lisinopril.     Health Maintenance   Declines flu shot, pneumonia vaccine, and new shingles vaccine.        Diet  He stated that he is cutting down on fruit due to sugar content, but eats plenty of vegetables.        Left index Pain  Patient endorses of left index finger pain that was noticed about a year ago. Pain was mild in severity and not bad. Does not remember if there was swelling or a particular injury.   However, recently he stated that when his wife grabbed his finger while holding his hand, his finger became painful. He also noted that he stopped using spray Deoderant due to the action being painful. Patient would like xray today.       Vitamin D   He is taking multivitamin. Does not drink a lot of milk products.    Reviewed and updated as needed this visit by clinical staff  Tobacco  Allergies  Meds  Med Hx  Surg Hx  Fam Hx  Soc Hx        Reviewed and updated as needed this visit by Provider  Tobacco  Allergies  Meds  Med Hx  Surg Hx  Fam Hx  Soc Hx       Social History     Tobacco Use     Smoking status: Former Smoker     Types: Cigarettes     Smokeless tobacco: Former User     Quit date: 5/1/2000     Tobacco comment: 0.5 ppd prior to 5/2000   Substance Use Topics     Alcohol use: No                           Current providers sharing in care for this patient include:   Patient Care Team:  Martha Brunson MD as PCP - General    The following  health maintenance items are reviewed in Epic and correct as of today:  Health Maintenance   Topic Date Due     HEPATITIS C SCREENING  11/22/1970     ZOSTER IMMUNIZATION (1 of 2) 11/22/2002     ADVANCE DIRECTIVE PLANNING Q5 YRS  11/22/2007     DTAP/TDAP/TD IMMUNIZATION (3 - Td) 01/29/2016     FALL RISK ASSESSMENT  11/22/2017     PNEUMOVAX IMMUNIZATION 65+ HIGH/HIGHEST RISK (1 of 2 - PCV13) 11/22/2017     INFLUENZA VACCINE (1) 09/01/2018     COPD ACTION PLAN Q1 YR  10/17/2018     PHQ-2 Q1 YR  10/17/2018     LIPID SCREEN Q5 YR MALE (SYSTEM ASSIGNED)  11/10/2019     COLONOSCOPY Q5 YR  12/15/2022     SPIROMETRY ONETIME  Completed     AORTIC ANEURYSM SCREENING (SYSTEM ASSIGNED)  Completed     IPV IMMUNIZATION  Aged Out     MENINGITIS IMMUNIZATION  Aged Out     Labs reviewed in EPIC  BP Readings from Last 3 Encounters:   12/15/17 122/77   10/17/17 134/68   11/14/16 138/80    Wt Readings from Last 3 Encounters:   12/11/18 105.2 kg (232 lb)   10/17/17 105.3 kg (232 lb 3.2 oz)   11/14/16 109.2 kg (240 lb 12.8 oz)                  Patient Active Problem List   Diagnosis     Urinary hesitancy     Skin lesion     Numbness of toes     Mechanical low back pain     Allergy to shellfish     Hearing loss     Hypertrophy of prostate without urinary obstruction     Encounter for removal of sutures     CARDIOVASCULAR SCREENING; LDL GOAL LESS THAN 130     Hypotestosteronism     Hypertension goal BP (blood pressure) < 140/90     Non morbid obesity due to excess calories     Elevated fasting blood sugar     moderate obstructive lung (H)     Past Surgical History:   Procedure Laterality Date     C APPENDECTOMY  9 yrs old     C NONSPECIFIC PROCEDURE  11/29/2005    Ganglion Cyst Removal - Left Ankle     COLONOSCOPY  10/11/2007     COLONOSCOPY WITH CO2 INSUFFLATION N/A 12/15/2017    Procedure: COLONOSCOPY WITH CO2 INSUFFLATION;  Screen for colon cancer  BMI: 32.32  Pharm: [facility]  [phone]  Ref: Dr. Brunson  Ins: [employer];  " Surgeon: Roman Stein MD;  Location:  OR       Social History     Tobacco Use     Smoking status: Former Smoker     Types: Cigarettes     Smokeless tobacco: Former User     Quit date: 5/1/2000     Tobacco comment: 0.5 ppd prior to 5/2000   Substance Use Topics     Alcohol use: No     Family History   Problem Relation Age of Onset     Cerebrovascular Disease Mother         aneursym     Cancer Mother         lung, smoker     Alcohol/Drug Father         etoh, tob     Hypertension Father      Prostate Cancer Father      Respiratory Father         COPD     Cardiovascular Father         carotid artery surgery- stint placed 6 years ago     CBettyeACLIVE Maternal Grandmother      Cancer Maternal Grandfather         lung     Cerebrovascular Disease Paternal Grandmother      C.A.D. Paternal Grandfather      Neurologic Disorder Brother         hydrocephalus         Current Outpatient Medications   Medication Sig Dispense Refill     anastrozole (ARIMIDEX) 1 MG tablet TAKE 1/2 TABLET BY MOUTH TWICE PER WEEK  1     B-D TB SYRINGE 27G X 1/2\" 1 ML MISC USE AS DIRECTED TWICE WEEKLY  1     lisinopril (PRINIVIL/ZESTRIL) 10 MG tablet TAKE 1 TABLET (10 MG) BY MOUTH DAILY. APPOINTMENT NEEDED FOR ADDITIONAL REFILLS. 30 tablet 0     MULTIVITAL OR daily       TESTOSTERONE IM injection twice a week - 0.25 mL bid       Allergies   Allergen Reactions     Shellfish Allergy Anaphylaxis     Seafood      Recent Labs   Lab Test 03/24/17 11/10/14  0959 06/12/13  0839   LDL  --  106  --    HDL  --  52  --    TRIG  --  73  --    ALT 23 34 27   CR 1.18 1.10  --    GFRESTIMATED 65 68  --    GFRESTBLACK 75 82  --    POTASSIUM 4.5 4.4  --    TSH  --  0.64  --       Pneumonia Vaccine: patient declined Pneumonia vaccine.      ROS:  Constitutional, HEENT, cardiovascular, pulmonary, GI, , musculoskeletal, neuro, skin, endocrine and psych systems are negative, except as otherwise noted.    POS: Tinnitus. No hearing changes.   POS: wearing hearing aids and are " "effective.    POS: patient follows Dermatology once a year for skin screening.   POS: left foot with tingling since Ankle cyst removal 10 years ago. No swelling in the feet.   NEG: no breathing issues. Exercises with toleration.     This document serves as a record of the services and decisions personally performed and made by Martha Brunson MD. It was created on her behalf by Tobin Alcantara, a trained medical scribe. The creation of this document is based on the provider's statements to the medical scribe.  Tobin Alcantara December 11, 2018 7:12 AM      OBJECTIVE:   /84 (BP Location: Right arm)   Pulse 71   Temp 98.1  F (36.7  C) (Oral)   Resp 18   Ht 1.81 m (5' 11.25\")   Wt 105.2 kg (232 lb)   SpO2 95%   BMI 32.13 kg/m   Estimated body mass index is 32.13 kg/m  as calculated from the following:    Height as of this encounter: 1.81 m (5' 11.25\").    Weight as of this encounter: 105.2 kg (232 lb).  EXAM:   GENERAL: alert, no distress and obese  EYES: Eyes grossly normal to inspection, PERRL and conjunctivae and sclerae normal  HENT: ear canals and TM's normal, nose and mouth without ulcers or lesions  NECK: no adenopathy, no asymmetry, masses, or scars and thyroid normal to palpation  RESP: lungs clear to auscultation - no rales, rhonchi or wheezes  CV: regular rate and rhythm, normal S1 S2, no S3 or S4, no murmur, click or rub, no peripheral edema and peripheral pulses strong  ABDOMEN: soft, nontender, no hepatosplenomegaly, no masses and bowel sounds normal  RECTAL: normal sphincter tone, no rectal masses and prostate 1+ enlarged, nontender  MS: left index finger, FROM, no swelling, tenderness at the DIP joint.   FROM in all extremities.   SKIN: no suspicious lesions or rashes  NEURO: Normal strength and tone, mentation intact and speech normal  PSYCH: mentation appears normal, affect normal/bright    Diagnostic Test Results:    Results for orders placed or performed in visit on 12/11/18   Lipid panel " reflex to direct LDL Fasting   Result Value Ref Range    Cholesterol 168 <200 mg/dL    Triglycerides 88 <150 mg/dL    HDL Cholesterol 53 >39 mg/dL    LDL Cholesterol Calculated 97 <100 mg/dL    Non HDL Cholesterol 115 <130 mg/dL     Xray - XR Finger Left today I independently visualized the xray:  - no fracture or dislocation noted.  ASSESSMENT / PLAN:   1. Medicare annual wellness visit, subsequent  Health Maintenance reviewed.    Reviewed recent labs from Homeopathic provider - Glucose - 110, Creat - 1.09, GFR 71. PSA0.8, HGB 17.3    2. Hypertension goal BP (blood pressure) < 140/90  Patient will remain off lisinopril as directed and begin terazosin. Side effects and benefits of medication reviewed with patient.  Patient will return to clinic in one months for blood pressure recheck. Adjustments to medication dosage and further refills will be made when blood pressure received.   - Lipid panel reflex to direct LDL Fasting  - terazosin (HYTRIN) 2 MG capsule; Take 1 capsule (2 mg) by mouth At Bedtime  Dispense: 30 capsule; Refill: 1    3. Elevated fasting blood sugar  Labs done elsewhere reviewed with patient. Glucose - 110, A1C at 5.4%      4. Urinary hesitancy  As above.   - terazosin (HYTRIN) 2 MG capsule; Take 1 capsule (2 mg) by mouth At Bedtime  Dispense: 30 capsule; Refill: 1    5. Pain of finger of left hand - OA finger  Onset about one year ago, recently becoming painful. Patient would like xray today. I will notify patient of final results when received from radiology.   - XR Finger Left G/E 2 Views; Future    7.  COPD - moderate on testing  Patient denies symptoms and declines treatment.   Has stopped smoking.        End of Life Planning:  Patient currently has an advanced directive: No.  I have verified the patient's ablity to prepare an advanced directive/make health care decisions.  Literature was provided to assist patient in preparing an advanced directive.    COUNSELING:  Reviewed preventive health  "counseling, as reflected in patient instructions       Regular exercise       Healthy diet/nutrition       Vision screening       Hearing screening       Immunizations    Declined: Influenza, New Shingles Vaccine and Pneumococcal due to patient declined.            Colon cancer screening       Prostate cancer screening       Osteoporosis Prevention/Bone Health    BP Readings from Last 1 Encounters:   12/15/17 122/77     Estimated body mass index is 32.13 kg/m  as calculated from the following:    Height as of this encounter: 1.81 m (5' 11.25\").    Weight as of this encounter: 105.2 kg (232 lb).      Weight management plan: Discussed healthy diet and exercise guidelines     reports that he has quit smoking. His smoking use included cigarettes. He quit smokeless tobacco use about 18 years ago.      Appropriate preventive services were discussed with this patient, including applicable screening as appropriate for cardiovascular disease, diabetes, osteopenia/osteoporosis, and glaucoma.  As appropriate for age/gender, discussed screening for colorectal cancer, prostate cancer, breast cancer, and cervical cancer. Checklist reviewing preventive services available has been given to the patient.    Reviewed patients plan of care and provided an AVS. The Basic Care Plan (routine screening as documented in Health Maintenance) for Cody meets the Care Plan requirement. This Care Plan has been established and reviewed with the Patient.    Counseling Resources:  ATP IV Guidelines  Pooled Cohorts Equation Calculator  Breast Cancer Risk Calculator  FRAX Risk Assessment  ICSI Preventive Guidelines  Dietary Guidelines for Americans, 2010  USDA's MyPlate  ASA Prophylaxis  Lung CA Screening    The information in this document, created by the medical scribe for me, accurately reflects the services I personally performed and the decisions made by me. I have reviewed and approved this document for accuracy prior to leaving the patient care " area.  December 11, 2018 7:57 AM     Martha Brunson MD  [facility]          Lung Cancer Screening Shared Decision Making Visit     Cody Jose is not eligible for lung cancer screening on the basis of the information provided in my signed lung cancer screening order. Cody's smoking history is below the threshold and so it is not recommended. Quit >15 years ago    Patient is not currently a smoker and so we did discuss that the only way to prevent lung cancer is to not smoke. Smoking cessation assistance was not offered.    ShouldIScreen

## 2018-12-12 NOTE — RESULT ENCOUNTER NOTE
Your cholesterol is under very good control currently.    Please call or MyChart message me if you have any questions.   PSK

## 2018-12-31 ENCOUNTER — ALLIED HEALTH/NURSE VISIT (OUTPATIENT)
Dept: NURSING | Facility: CLINIC | Age: 68
End: 2018-12-31
Payer: MEDICARE

## 2018-12-31 VITALS — DIASTOLIC BLOOD PRESSURE: 84 MMHG | SYSTOLIC BLOOD PRESSURE: 146 MMHG

## 2018-12-31 DIAGNOSIS — I10 HYPERTENSION GOAL BP (BLOOD PRESSURE) < 140/90: Primary | ICD-10-CM

## 2018-12-31 PROCEDURE — 99207 ZZC NO CHARGE NURSE ONLY: CPT

## 2018-12-31 RX ORDER — TERAZOSIN 5 MG/1
5 CAPSULE ORAL AT BEDTIME
Qty: 90 CAPSULE | Refills: 0 | OUTPATIENT
Start: 2018-12-31 | End: 2024-08-08

## 2018-12-31 NOTE — PROGRESS NOTES
Cody Jose is a 68 year old patient who comes in today for a Blood Pressure check.  Initial BP:  /84      Data Unavailable  Disposition: results routed to provider    Patient has 8 pills remaining of Hytrin, he will need refill if you would like to continue this medication.  Patient states his sleeping has not improved and he would not mind going back on his old HTN medication if you want to change this.

## 2018-12-31 NOTE — PROGRESS NOTES
Please call patient re:  BP is better now than when off the medication but not completely controlled on the current dose.  Since he has not gotten improvement on the bladder/prostate symptoms, we could increase the dose of the Hytrin to 5 mg at night and see if that controls the BP and improves the bladder symptoms.  Sometimes have to get to 10 mg (occasionally 20 mg) before symptoms are controlled.  If he is agreeable to trying the larger dose he can take 2 of the 2 mg dose at night until gone and I will send the 5 mg dose in for him. To go to when the other pills are gone.  Follow up BP with nurse visit in 3-4 weeks.  Sooner follow up if side effects or other symptoms occur.      PSK      BP Readings from Last 3 Encounters:   12/31/18 146/84   12/11/18 163/84   12/15/17 122/77

## 2019-01-07 DIAGNOSIS — I10 HYPERTENSION GOAL BP (BLOOD PRESSURE) < 140/90: ICD-10-CM

## 2019-01-07 DIAGNOSIS — R39.11 URINARY HESITANCY: ICD-10-CM

## 2019-01-07 RX ORDER — TERAZOSIN 2 MG/1
2 CAPSULE ORAL AT BEDTIME
Qty: 30 CAPSULE | Refills: 1 | Status: CANCELLED | OUTPATIENT
Start: 2019-01-07

## 2019-01-07 NOTE — TELEPHONE ENCOUNTER
Reason for Call:  Medication or medication refill:    Do you use a [facility]?  Name of the pharmacy and phone number for the current request:  [facility]/pharmacy #[id number] [facility] [address] [address]     Name of the medication requested: terazosin (HYTRIN) 2 MG capsule    Can we leave a detailed message on this number? YES    Phone number patient can be reached at: [phone]   Best Time: anytime    Call taken on 1/7/2019 at 8:33 AM by Simon Jones          Pt was switched to t erazosin to 2mg

## 2019-01-07 NOTE — TELEPHONE ENCOUNTER
"Requested Prescriptions   Pending Prescriptions Disp Refills     terazosin (HYTRIN) 2 MG capsule 30 capsule 1     Sig: Take 1 capsule (2 mg) by mouth At Bedtime    Alpha Blockers Failed - 1/7/2019 11:40 AM       Failed - Blood pressure under 140/90 in past 12 months    BP Readings from Last 3 Encounters:   12/31/18 146/84   12/11/18 163/84   12/15/17 122/77                Passed - Recent (12 mo) or future (30 days) visit within the authorizing provider's specialty    Patient had office visit in the last 12 months or has a visit in the next 30 days with authorizing provider or within the authorizing provider's specialty.  See \"Patient Info\" tab in inbasket, or \"Choose Columns\" in Meds & Orders section of the refill encounter.             Passed - Patient does not have Tadalafil, Vardenafil, or Sildenafil on their medication list       Passed - Medication is active on med list       Passed - Patient is 18 years of age or older        terazosin (HYTRIN) 2 MG capsule  Last Written Prescription Date:  12/11/18  Last Fill Quantity: 30,  # refills: 1   Last office visit: 12/11/2018 with prescribing provider:  Dr. Brunson   Future Office Visit:      "

## 2019-01-07 NOTE — TELEPHONE ENCOUNTER
Reason for Call:  Medication or medication refill:  Patient stopped into clinic.  Ran out of this medication Sunday evening.  The dosage is not working for him.  Please advise as patient had BP check taken last week in clinic.     Do you use a [facility]?  Name of the pharmacy and phone number for the current request:  [facility]/pharmacy #[id number] [address]       Name of the medication requested: terazosin (HYTRIN) 2 MG capsule     Can we leave a detailed message on this number? YES     Phone number patient can be reached at: [phone]   Best Time: anytime                    Pt was switched to t erazosin to 2mg

## 2019-01-08 RX ORDER — TERAZOSIN 5 MG/1
5 CAPSULE ORAL AT BEDTIME
Qty: 30 CAPSULE | Refills: 1 | Status: SHIPPED | OUTPATIENT
Start: 2019-01-08 | End: 2019-02-11 | Stop reason: ALTCHOICE

## 2019-01-08 NOTE — TELEPHONE ENCOUNTER
Patient is in lobby and is still seeking increased dosage of Terazosin 2 mg.  Call patient @ [phone] Patient uses [facility] on [address].

## 2019-01-08 NOTE — TELEPHONE ENCOUNTER
Routing to provider to advise. Patient is requesting an increase in this medication due to BP's still running high. See messages below.    Maura Majano RN, BSN

## 2019-01-08 NOTE — TELEPHONE ENCOUNTER
Please call patient re:  I have sent in the 5 mg hytrin for at night use.  Follow up in office for a nurse BP check in 3 -4 weeks recommended.    ELVIA

## 2019-02-05 ENCOUNTER — ALLIED HEALTH/NURSE VISIT (OUTPATIENT)
Dept: NURSING | Facility: CLINIC | Age: 69
End: 2019-02-05
Payer: MEDICARE

## 2019-02-05 VITALS — SYSTOLIC BLOOD PRESSURE: 138 MMHG | DIASTOLIC BLOOD PRESSURE: 76 MMHG

## 2019-02-05 DIAGNOSIS — I10 HYPERTENSION GOAL BP (BLOOD PRESSURE) < 140/90: Primary | ICD-10-CM

## 2019-02-05 PROCEDURE — 99207 ZZC NO CHARGE NURSE ONLY: CPT

## 2019-02-05 NOTE — NURSING NOTE
Cody Jose is a 68 year old patient who comes in today for a Blood Pressure check.  Initial BP:  /76      Data Unavailable  Disposition: results routed to provider

## 2019-02-09 ENCOUNTER — MYC MEDICAL ADVICE (OUTPATIENT)
Dept: FAMILY MEDICINE | Facility: CLINIC | Age: 69
End: 2019-02-09

## 2019-02-09 DIAGNOSIS — I10 HYPERTENSION GOAL BP (BLOOD PRESSURE) < 140/90: ICD-10-CM

## 2019-02-11 RX ORDER — LISINOPRIL 10 MG/1
10 TABLET ORAL DAILY
Qty: 90 TABLET | Refills: 1 | Status: SHIPPED | OUTPATIENT
Start: 2019-02-11 | End: 2019-07-01

## 2019-04-19 ENCOUNTER — TRANSFERRED RECORDS (OUTPATIENT)
Dept: HEALTH INFORMATION MANAGEMENT | Facility: CLINIC | Age: 69
End: 2019-04-19

## 2019-04-19 LAB
ALT SERPL-CCNC: 27 U/L (ref 9–46)
AST SERPL-CCNC: 19 U/L (ref 10–35)
CREAT SERPL-MCNC: 1.06 MG/DL (ref 0.7–1.25)
GFR SERPL CREATININE-BSD FRML MDRD: 73 ML/MIN/1.73M2
GLUCOSE SERPL-MCNC: 102 MG/DL (ref 65–99)
HBA1C MFR BLD: 6 % (ref 0–5.7)
POTASSIUM SERPL-SCNC: 4.6 MMOL/L (ref 3.5–5.3)

## 2019-06-22 DIAGNOSIS — I10 HYPERTENSION GOAL BP (BLOOD PRESSURE) < 140/90: ICD-10-CM

## 2019-06-24 NOTE — TELEPHONE ENCOUNTER
"Requested Prescriptions   Pending Prescriptions Disp Refills     lisinopril (PRINIVIL/ZESTRIL) 10 MG tablet [Pharmacy Med Name: LISINOPRIL 10 MG TABLET]  Last Written Prescription Date:  2/11/19  Last Fill Quantity: 90 tablet,  # refills: 1   Last office visit: 12/11/2018 with prescribing provider:  Dr. Brunson   Future Office Visit:     90 tablet 1     Sig: TAKE 1 TABLET BY MOUTH EVERY DAY       ACE Inhibitors (Including Combos) Protocol Passed - 6/22/2019  1:00 PM        Passed - Blood pressure under 140/90 in past 12 months     BP Readings from Last 3 Encounters:   02/05/19 138/76   12/31/18 146/84   12/11/18 163/84                 Passed - Recent (12 mo) or future (30 days) visit within the authorizing provider's specialty     Patient had office visit in the last 12 months or has a visit in the next 30 days with authorizing provider or within the authorizing provider's specialty.  See \"Patient Info\" tab in inbasket, or \"Choose Columns\" in Meds & Orders section of the refill encounter.              Passed - Medication is active on med list        Passed - Patient is age 18 or older        Passed - Normal serum creatinine on file in past 12 months     Recent Labs   Lab Test 10/09/18  06/25/13  0806   CR 1.09   < >  --    CRPOC  --   --  1.0    < > = values in this interval not displayed.             Passed - Normal serum potassium on file in past 12 months     Recent Labs   Lab Test 10/09/18   POTASSIUM 4.8               "

## 2019-06-26 RX ORDER — LISINOPRIL 10 MG/1
TABLET ORAL
Qty: 90 TABLET | Refills: 1 | OUTPATIENT
Start: 2019-06-26

## 2019-07-01 RX ORDER — LISINOPRIL 10 MG/1
10 TABLET ORAL DAILY
Qty: 90 TABLET | Refills: 0 | Status: SHIPPED | OUTPATIENT
Start: 2019-07-01 | End: 2019-10-23

## 2019-07-01 NOTE — TELEPHONE ENCOUNTER
"Requested Prescriptions   Pending Prescriptions Disp Refills     lisinopril (PRINIVIL/ZESTRIL) 10 MG tablet 90 tablet 1     Sig: Take 1 tablet (10 mg) by mouth daily       ACE Inhibitors (Including Combos) Protocol Passed - 7/1/2019  9:51 AM        Passed - Blood pressure under 140/90 in past 12 months     BP Readings from Last 3 Encounters:   02/05/19 138/76   12/31/18 146/84   12/11/18 163/84                 Passed - Recent (12 mo) or future (30 days) visit within the authorizing provider's specialty     Patient had office visit in the last 12 months or has a visit in the next 30 days with authorizing provider or within the authorizing provider's specialty.  See \"Patient Info\" tab in inGamisfactionet, or \"Choose Columns\" in Meds & Orders section of the refill encounter.              Passed - Medication is active on med list        Passed - Patient is age 18 or older        Passed - Normal serum creatinine on file in past 12 months     Recent Labs   Lab Test 10/09/18  06/25/13  0806   CR 1.09   < >  --    CRPOC  --   --  1.0    < > = values in this interval not displayed.             Passed - Normal serum potassium on file in past 12 months     Recent Labs   Lab Test 10/09/18   POTASSIUM 4.8           Refused Prescriptions Disp Refills     lisinopril (PRINIVIL/ZESTRIL) 10 MG tablet [Pharmacy Med Name: LISINOPRIL 10 MG TABLET] 90 tablet 1     Sig: TAKE 1 TABLET BY MOUTH EVERY DAY       ACE Inhibitors (Including Combos) Protocol Passed - 7/1/2019  9:50 AM        Passed - Blood pressure under 140/90 in past 12 months     BP Readings from Last 3 Encounters:   02/05/19 138/76   12/31/18 146/84   12/11/18 163/84                 Passed - Recent (12 mo) or future (30 days) visit within the authorizing provider's specialty     Patient had office visit in the last 12 months or has a visit in the next 30 days with authorizing provider or within the authorizing provider's specialty.  See \"Patient Info\" tab in inGamisfactionet, or \"Choose " "Columns\" in Meds & Orders section of the refill encounter.              Passed - Medication is active on med list        Passed - Patient is age 18 or older        Passed - Normal serum creatinine on file in past 12 months     Recent Labs   Lab Test 10/09/18  06/25/13  0806   CR 1.09   < >  --    CRPOC  --   --  1.0    < > = values in this interval not displayed.             Passed - Normal serum potassium on file in past 12 months     Recent Labs   Lab Test 10/09/18   POTASSIUM 4.8             Medication is being filled for 1 time refill only due to:  Patient needs labs due in October 2019.       Maura Majano RN, BSN, PHN      "

## 2019-07-01 NOTE — TELEPHONE ENCOUNTER
Reason for Call:  Other prescription    Detailed comments:  Pt calling to follow up on medication request and would like to see if Dr. Brunson can send in Rx to [facility] and hoping he can have Rx approved for Pt is out.  He would like a call back to confirm Rx has been sent.    Phone Number Patient can be reached at: Cell number on file:    Telephone Information:   Mobile [phone]       Best Time: anytime    Can we leave a detailed message on this number? YES    Call taken on 7/1/2019 at 9:34 AM by Simon Jones

## 2019-10-23 DIAGNOSIS — I10 HYPERTENSION GOAL BP (BLOOD PRESSURE) < 140/90: ICD-10-CM

## 2019-10-23 NOTE — TELEPHONE ENCOUNTER
"Requested Prescriptions   Pending Prescriptions Disp Refills     lisinopril (PRINIVIL/ZESTRIL) 10 MG tablet [Pharmacy Med Name: LISINOPRIL 10 MG TABLET] 90 tablet 0     Sig: TAKE 1 TABLET BY MOUTH EVERY DAY       ACE Inhibitors (Including Combos) Protocol Failed - 10/23/2019 10:43 AM        Failed - Normal serum creatinine on file in past 12 months     Recent Labs   Lab Test 10/09/18  06/25/13  0806   CR 1.09   < >  --    CRPOC  --   --  1.0    < > = values in this interval not displayed.             Failed - Normal serum potassium on file in past 12 months     Recent Labs   Lab Test 10/09/18   POTASSIUM 4.8             Passed - Blood pressure under 140/90 in past 12 months     BP Readings from Last 3 Encounters:   02/05/19 138/76   12/31/18 146/84   12/11/18 163/84                 Passed - Recent (12 mo) or future (30 days) visit within the authorizing provider's specialty     Patient has had an office visit with the authorizing provider or a provider within the authorizing providers department within the previous 12 mos or has a future within next 30 days. See \"Patient Info\" tab in inbasket, or \"Choose Columns\" in Meds & Orders section of the refill encounter.              Passed - Medication is active on med list        Passed - Patient is age 18 or older        lisinopril (PRINIVIL/ZESTRIL) 10 MG tablet  Last Written Prescription Date:  7/1/19  Last Fill Quantity: 90,  # refills: 0   Last office visit: 12/11/2018 with prescribing provider:  Dr. Brunson   Future Office Visit:      "

## 2019-10-25 RX ORDER — LISINOPRIL 10 MG/1
TABLET ORAL
Qty: 30 TABLET | Refills: 0 | Status: SHIPPED | OUTPATIENT
Start: 2019-10-25 | End: 2019-10-31

## 2019-10-25 NOTE — TELEPHONE ENCOUNTER
Please schedule patient. Kasey refill given.   Needs appointment and labs for further refills.     Marlene Ponce RN  [facility]

## 2019-10-31 ENCOUNTER — OFFICE VISIT (OUTPATIENT)
Dept: FAMILY MEDICINE | Facility: CLINIC | Age: 69
End: 2019-10-31
Payer: MEDICARE

## 2019-10-31 VITALS
WEIGHT: 234.2 LBS | HEIGHT: 71 IN | SYSTOLIC BLOOD PRESSURE: 136 MMHG | OXYGEN SATURATION: 95 % | RESPIRATION RATE: 20 BRPM | HEART RATE: 73 BPM | TEMPERATURE: 98 F | BODY MASS INDEX: 32.79 KG/M2 | DIASTOLIC BLOOD PRESSURE: 76 MMHG

## 2019-10-31 DIAGNOSIS — I10 HYPERTENSION GOAL BP (BLOOD PRESSURE) < 140/90: Primary | ICD-10-CM

## 2019-10-31 DIAGNOSIS — J43.9 PULMONARY EMPHYSEMA, UNSPECIFIED EMPHYSEMA TYPE (H): ICD-10-CM

## 2019-10-31 DIAGNOSIS — Z87.891 PERSONAL HISTORY OF TOBACCO USE: ICD-10-CM

## 2019-10-31 PROCEDURE — 99214 OFFICE O/P EST MOD 30 MIN: CPT | Performed by: FAMILY MEDICINE

## 2019-10-31 RX ORDER — LISINOPRIL 10 MG/1
10 TABLET ORAL DAILY
Qty: 90 TABLET | Refills: 3 | Status: SHIPPED | OUTPATIENT
Start: 2019-10-31 | End: 2020-10-06

## 2019-10-31 ASSESSMENT — MIFFLIN-ST. JEOR: SCORE: 1868.41

## 2019-10-31 ASSESSMENT — PAIN SCALES - GENERAL: PAINLEVEL: NO PAIN (0)

## 2019-10-31 NOTE — PROGRESS NOTES
Subjective     Cody Jose is a 68 year old male who presents to clinic today for the following health issues:    HPI     Hypertension Follow-up  He is compliant with lisinopril 10 mg daily and requesting refills. He checks his blood pressure outside of the clinic. His systolic pressures range in the mid 130's to low 140's and diastolic pressures are always below 80.    BP Readings from Last 3 Encounters:   10/31/19 136/76   02/05/19 138/76   12/31/18 146/84       Do you check your blood pressure regularly outside of the clinic? No    Are you following a low salt diet? No    Are your blood pressures ever more than 140 on the top number (systolic) OR more   than 90 on the bottom number (diastolic), for example 140/90? Yes      How many servings of fruits and vegetables do you eat daily?  2-3    On average, how many sweetened beverages do you drink each day (soda, juice, sweet tea, etc)?   0    How many days per week do you miss taking your medication? 0    Diet  Patient notes since August 2019 he has been going to [facility] for eating awareness.      He is waking up 1-2x nightly to urinate. If he has a late dinner, he may wake up three times nightly. He does not have difficulty falling back to sleep.    Additional Notes  COPD Follow-Up    Overall, how are your COPD symptoms since your last clinic visit?  Better    How much fatigue or shortness of breath do you have when you are walking?  None    How much shortness of breath do you have when you are resting?  None    How often do you cough? Never    Have you noticed any change in your sputum/phlegm?  No    Have you experienced a recent fever? No    Please describe how far you can walk without stopping to rest:  1-2 miles    How many flights of stairs are you able to walk up without stopping?  3 or more    Have you had any Emergency Room Visits, Urgent Care Visits, or Hospital Admissions because of your COPD since your last office visit?  No    History    Smoking Status     Former Smoker     Packs/day: 0.50     Years: 10.00     Types: Cigarettes   Smokeless Tobacco     Former User     Quit date: 5/1/2000     Comment: 0.5 ppd prior to 5/2000     No results found for: FEV1, [id number]      Patient stopped smoking about 20 years ago. He smoked about 1/2 PPD for 10 years.    Patient follows with a functional medicine doctor. He reports he is taking testosterone injections twice weekly with 1/2 tablet of anastrozole (1/2 mg) with each injection.    Patient Active Problem List   Diagnosis     Urinary hesitancy     Skin lesion     Numbness of toes     Mechanical low back pain     Allergy to shellfish     Hearing loss     Hypertrophy of prostate without urinary obstruction     Encounter for removal of sutures     CARDIOVASCULAR SCREENING; LDL GOAL LESS THAN 130     Hypotestosteronism     Hypertension goal BP (blood pressure) < 140/90     Non morbid obesity due to excess calories     Elevated fasting blood sugar     moderate obstructive lung (H)     Past Surgical History:   Procedure Laterality Date     C APPENDECTOMY  9 yrs old     C NONSPECIFIC PROCEDURE  11/29/2005    Ganglion Cyst Removal - Left Ankle     COLONOSCOPY  10/11/2007     COLONOSCOPY WITH CO2 INSUFFLATION N/A 12/15/2017    Procedure: COLONOSCOPY WITH CO2 INSUFFLATION;  Screen for colon cancer  BMI: 32.32  Pharm: [facility] 101/55  [phone]  Ref: Dr. Brunson  Ins: [employer];  Surgeon: Roman Stein MD;  Location:  OR       Social History     Tobacco Use     Smoking status: Former Smoker     Types: Cigarettes     Smokeless tobacco: Former User     Quit date: 5/1/2000     Tobacco comment: 0.5 ppd prior to 5/2000   Substance Use Topics     Alcohol use: No     Family History   Problem Relation Age of Onset     Cerebrovascular Disease Mother         aneursym     Cancer Mother         lung, smoker     Alcohol/Drug Father         etoh, tob     Hypertension Father      Prostate Cancer Father      Respiratory  "Father         COPD     Cardiovascular Father         carotid artery surgery- stint placed 6 years ago     CARLOS Maternal Grandmother      Cancer Maternal Grandfather         lung     Cerebrovascular Disease Paternal Grandmother      CARLOS Paternal Grandfather      Neurologic Disorder Brother         hydrocephalus         Current Outpatient Medications   Medication Sig Dispense Refill     anastrozole (ARIMIDEX) 1 MG tablet TAKE 1/2 TABLET BY MOUTH TWICE PER WEEK  1     B-D TB SYRINGE 27G X 1/2\" 1 ML MISC USE AS DIRECTED TWICE WEEKLY  1     lisinopril (PRINIVIL/ZESTRIL) 10 MG tablet Take 1 tablet (10 mg) by mouth daily 90 tablet 3     MULTIVITAL OR daily       TESTOSTERONE IM injection twice a week - 0.25 mL bid       Allergies   Allergen Reactions     Shellfish Allergy Anaphylaxis     Seafood      Recent Labs   Lab Test 12/11/18  0740 10/09/18 03/24/17 11/10/14  0959   A1C  --  5.4  --   --    LDL 97  --   --  106   HDL 53  --   --  52   TRIG 88  --   --  73   ALT  --  24 23 34   CR  --  1.09 1.18 1.10   GFRESTIMATED  --  71 65 68   GFRESTBLACK  --  82 75 82   POTASSIUM  --  4.8 4.5 4.4   TSH  --   --   --  0.64      BP Readings from Last 3 Encounters:   10/31/19 136/76   02/05/19 138/76   12/31/18 146/84    Wt Readings from Last 3 Encounters:   10/31/19 106.2 kg (234 lb 3.2 oz)   12/11/18 105.2 kg (232 lb)   10/17/17 105.3 kg (232 lb 3.2 oz)         Reviewed and updated as needed this visit by Provider  Tobacco  Allergies  Meds  Med Hx  Surg Hx  Fam Hx  Soc Hx        Review of Systems   ROS COMP: Constitutional, HEENT, cardiovascular, pulmonary, GI, , musculoskeletal, neuro, skin, endocrine and psych systems are negative, except as otherwise noted.    This document serves as a record of the services and decisions personally performed and made by Martha Brunson MD. It was created on her behalf by Stacy Norman, a trained medical scribe. The creation of this document is based on the provider's statements " "to the medical scribe.  Stacy Norman 8:30 AM 10/31/2019          Objective    /76   Pulse 73   Temp 98  F (36.7  C) (Oral)   Resp 20   Ht 1.81 m (5' 11.25\")   Wt 106.2 kg (234 lb 3.2 oz)   SpO2 95%   BMI 32.44 kg/m    Body mass index is 32.44 kg/m .  Physical Exam   GENERAL: healthy, alert and no distress  NECK: no adenopathy, no asymmetry, masses, or scars and thyroid normal to palpation  RESP: lungs clear to auscultation - no rales, rhonchi or wheezes  CV: regular rate and rhythm, normal S1 S2, no S3 or S4, no murmur, click or rub, no peripheral edema and peripheral pulses strong  ABDOMEN: soft, nontender, no hepatosplenomegaly, no masses and bowel sounds normal  MS: no gross musculoskeletal defects noted, no edema    Diagnostic Test Results:  Labs reviewed in Epic and from [employer] that patient brought with him for our records.          Assessment & Plan     1. Hypertension goal BP (blood pressure) < 140/90  Controlled.  Continue current medication.  Recent labs that patient will forward for our records as well.    - lisinopril (PRINIVIL/ZESTRIL) 10 MG tablet; Take 1 tablet (10 mg) by mouth daily  Dispense: 90 tablet; Refill: 3    2. Pulmonary emphysema, unspecified emphysema type (H)  Symptoms absent currently.  No ongoing smoke exposure.  Spirometry would be recommended for follow up in future.     3. Personal history of tobacco use  Limited smoke exposure, not ongoing.  Not a candidate for cancer screening.      BMI:   Estimated body mass index is 32.44 kg/m  as calculated from the following:    Height as of this encounter: 1.81 m (5' 11.25\").    Weight as of this encounter: 106.2 kg (234 lb 3.2 oz).   Weight management plan: Discussed healthy diet and exercise guidelines        Patient Instructions   Continue current medication.  Refill Lisinopril sent to pharmacy.    Please forward copy of the recent labs when available.          Return in about 1 year (around 10/31/2020) for chronic health " problems.    The information in this document, created by the medical scribe, Stacy Norman, for me, accurately reflects the services I personally performed and the decisions made by me. I have reviewed and approved this document for accuracy prior to leaving the patient care area.    Martha Brunson MD  [facility]

## 2019-10-31 NOTE — PATIENT INSTRUCTIONS
Continue current medication.  Refill Lisinopril sent to pharmacy.    Please forward copy of the recent labs when available.

## 2019-11-01 NOTE — PROGRESS NOTES
Lung Cancer Screening Shared Decision Making Visit     Cody Jose is not eligible for lung cancer screening on the basis of the information provided in my signed lung cancer screening order. Cody's smoking history is below the threshold and so it is not recommended.  1/2 ppd for 10 years, quit 19 years ago.    Patient is not currently a smoker and so we did not discuss that the only way to prevent lung cancer is to not smoke. Smoking cessation assistance was not offered.    ShouldIScreen

## 2019-11-05 ENCOUNTER — TRANSFERRED RECORDS (OUTPATIENT)
Dept: HEALTH INFORMATION MANAGEMENT | Facility: CLINIC | Age: 69
End: 2019-11-05

## 2019-11-08 ENCOUNTER — HEALTH MAINTENANCE LETTER (OUTPATIENT)
Age: 69
End: 2019-11-08

## 2020-10-03 DIAGNOSIS — I10 HYPERTENSION GOAL BP (BLOOD PRESSURE) < 140/90: ICD-10-CM

## 2020-10-06 RX ORDER — LISINOPRIL 10 MG/1
10 TABLET ORAL DAILY
Qty: 30 TABLET | Refills: 0 | Status: SHIPPED | OUTPATIENT
Start: 2020-10-06 | End: 2020-10-14

## 2020-10-06 NOTE — TELEPHONE ENCOUNTER
Routing refill request to provider for review/approval because:  Labs not current:  Creatinine and potassium    Krysta Garcia RN  [facility]

## 2020-10-14 ENCOUNTER — OFFICE VISIT (OUTPATIENT)
Dept: FAMILY MEDICINE | Facility: CLINIC | Age: 70
End: 2020-10-14
Payer: MEDICARE

## 2020-10-14 VITALS
SYSTOLIC BLOOD PRESSURE: 134 MMHG | BODY MASS INDEX: 30.66 KG/M2 | RESPIRATION RATE: 16 BRPM | WEIGHT: 219 LBS | OXYGEN SATURATION: 96 % | HEIGHT: 71 IN | HEART RATE: 71 BPM | TEMPERATURE: 97.3 F | DIASTOLIC BLOOD PRESSURE: 68 MMHG

## 2020-10-14 DIAGNOSIS — J43.9 PULMONARY EMPHYSEMA, UNSPECIFIED EMPHYSEMA TYPE (H): ICD-10-CM

## 2020-10-14 DIAGNOSIS — Z00.00 ENCOUNTER FOR MEDICARE ANNUAL WELLNESS EXAM: Primary | ICD-10-CM

## 2020-10-14 DIAGNOSIS — I10 HYPERTENSION GOAL BP (BLOOD PRESSURE) < 140/90: ICD-10-CM

## 2020-10-14 LAB
ALBUMIN SERPL-MCNC: 3.8 G/DL (ref 3.4–5)
ALP SERPL-CCNC: 39 U/L (ref 40–150)
ALT SERPL W P-5'-P-CCNC: 27 U/L (ref 0–70)
ANION GAP SERPL CALCULATED.3IONS-SCNC: 3 MMOL/L (ref 3–14)
AST SERPL W P-5'-P-CCNC: 13 U/L (ref 0–45)
BILIRUB SERPL-MCNC: 0.9 MG/DL (ref 0.2–1.3)
BUN SERPL-MCNC: 8 MG/DL (ref 7–30)
CALCIUM SERPL-MCNC: 9 MG/DL (ref 8.5–10.1)
CHLORIDE SERPL-SCNC: 107 MMOL/L (ref 94–109)
CHOLEST SERPL-MCNC: 128 MG/DL
CO2 SERPL-SCNC: 31 MMOL/L (ref 20–32)
CREAT SERPL-MCNC: 0.98 MG/DL (ref 0.66–1.25)
CREAT UR-MCNC: 191 MG/DL
GFR SERPL CREATININE-BSD FRML MDRD: 79 ML/MIN/{1.73_M2}
GLUCOSE SERPL-MCNC: 94 MG/DL (ref 70–99)
HDLC SERPL-MCNC: 51 MG/DL
LDLC SERPL CALC-MCNC: 61 MG/DL
MICROALBUMIN UR-MCNC: 7 MG/L
MICROALBUMIN/CREAT UR: 3.78 MG/G CR (ref 0–17)
NONHDLC SERPL-MCNC: 77 MG/DL
POTASSIUM SERPL-SCNC: 4.4 MMOL/L (ref 3.4–5.3)
PROT SERPL-MCNC: 7 G/DL (ref 6.8–8.8)
SODIUM SERPL-SCNC: 141 MMOL/L (ref 133–144)
TRIGL SERPL-MCNC: 80 MG/DL

## 2020-10-14 PROCEDURE — 82043 UR ALBUMIN QUANTITATIVE: CPT | Performed by: FAMILY MEDICINE

## 2020-10-14 PROCEDURE — 80053 COMPREHEN METABOLIC PANEL: CPT | Performed by: FAMILY MEDICINE

## 2020-10-14 PROCEDURE — G0439 PPPS, SUBSEQ VISIT: HCPCS | Performed by: FAMILY MEDICINE

## 2020-10-14 PROCEDURE — 80061 LIPID PANEL: CPT | Performed by: FAMILY MEDICINE

## 2020-10-14 PROCEDURE — 99213 OFFICE O/P EST LOW 20 MIN: CPT | Mod: 25 | Performed by: FAMILY MEDICINE

## 2020-10-14 PROCEDURE — 36415 COLL VENOUS BLD VENIPUNCTURE: CPT | Performed by: FAMILY MEDICINE

## 2020-10-14 RX ORDER — LISINOPRIL 10 MG/1
10 TABLET ORAL DAILY
Qty: 90 TABLET | Refills: 3 | Status: SHIPPED | OUTPATIENT
Start: 2020-10-14 | End: 2021-10-27

## 2020-10-14 RX ORDER — LISINOPRIL 10 MG/1
10 TABLET ORAL DAILY
Qty: 30 TABLET | Refills: 0 | Status: CANCELLED | OUTPATIENT
Start: 2020-10-14

## 2020-10-14 ASSESSMENT — MIFFLIN-ST. JEOR: SCORE: 1794.47

## 2020-10-14 ASSESSMENT — PAIN SCALES - GENERAL: PAINLEVEL: NO PAIN (0)

## 2020-10-14 NOTE — ADDENDUM NOTE
Addended by: THEODORE KHAN on: 10/14/2020 01:20 PM     Modules accepted: Orders, Level of Service

## 2020-10-14 NOTE — PATIENT INSTRUCTIONS
Fasting labs today.  Refill of medication will be sent when results return.        Patient Education   Personalized Prevention Plan  You are due for the preventive services outlined below.  Your care team is available to assist you in scheduling these services.  If you have already completed any of these items, please share that information with your care team to update in your medical record.  Health Maintenance Due   Topic Date Due     Hepatitis C Screening  1950     Discuss Advance Care Planning  1950     Zoster (Shingles) Vaccine (1 of 2) 11/22/2002     Pneumococcal Vaccine (1 of 1 - PPSV23) 11/22/2017     FALL RISK ASSESSMENT  12/11/2019     PHQ-2  01/01/2020     Flu Vaccine (1) 09/01/2020       At [facility], we strive to deliver an exceptional experience to you, every time we see you. If you receive a survey, please complete it as we do value your feedback.  If you have MyChart, you can expect to receive results automatically within 24 hours of their completion.  Your provider will send a note interpreting your results as well.   If you do not have MyChart, you should receive your results in about a week by mail.    Your care team:                            Family Medicine Internal Medicine   MD Ney Leroy MD Shantel Branch-Fleming, MD Srinivasa Vaka, MD Katya Georgiev PA-C  Magaly Ballesteros, APRN CNP    David Mejía MD Pediatrics   Hilton Browne, PA-C  Jaqueline Khan, MD Karyn Weeks APRN CNP   MD Nidia Merrill MD Deborah Mielke, MD Kim Thein, APRN CNP  Roxanna Langley, PA-C  Madison Rojas, CNP  MD Martha Anguiano MD Angela Wermerskirchen, MD      Clinic hours: Monday - Thursday 7 am-7 pm; Fridays 7 am-5 pm.   Urgent care: Monday - Friday 11 am-9 pm; Saturday and Sunday 9 am-5 pm.    Clinic: [phone]       [facility]: Monday - Thursday 8 am - 7  pm; Friday 8 am - 6 pm  [facility]: [phone]     Use [email or web address] for 24/7 diagnosis and treatment of dozens of conditions.

## 2020-10-14 NOTE — PROGRESS NOTES
"  SUBJECTIVE:   Cody Jose is a 69 year old male who presents for Preventive Visit.      Patient has been advised of split billing requirements and indicates understanding: Yes  Are you in the first 12 months of your Medicare Part B coverage?  No    Physical Health:    In general, how would you rate your overall physical health? good    Outside of work, how many days during the week do you exercise? 4-5 days/week  Strength, stretching, elliptical.      Outside of work, approximately how many minutes a day do you exercise?45-60 minutes    If you drink alcohol do you typically have >3 drinks per day or >7 drinks per week? No    Do you usually eat at least 4 servings of fruit and vegetables a day, include whole grains & fiber and avoid regularly eating high fat or \"junk\" foods? Yes    Do you have any problems taking medications regularly?  No    Do you have any side effects from medications? none    Needs assistance for the following daily activities: no assistance needed    Which of the following safety concerns are present in your home?  none identified     Hearing impairment: Yes, does have hearing aids     In the past 6 months, have you been bothered by leaking of urine? no    Mental Health:    In general, how would you rate your overall mental or emotional health? excellent  PHQ-2 Score:      Do you feel safe in your environment? Yes    Have you ever done Advance Care Planning? (For example, a Health Directive, POLST, or a discussion with a medical provider or your loved ones about your wishes): No, advance care planning information given to patient to review.  Patient plans to discuss their wishes with loved ones or provider.      Additional concerns to address?  No    Fall risk:  Fallen 2 or more times in the past year?: No  Any fall with injury in the past year?: No    Cognitive Screenin) Repeat 3 items (Leader, Season, Table)    2) Clock draw: NORMAL  3) 3 item recall: Recalls 2 objects   Results: " NORMAL clock, 1-2 items recalled: COGNITIVE IMPAIRMENT LESS LIKELY    Mini-CogTM Copyright [employer]E [employer]ee. Licensed by the author for use in [facility]; reprinted with permission (kavin@[facility]). All rights reserved.      Do you have sleep apnea, excessive snoring or daytime drowsiness?: no        Hypertension Follow-up      Do you check your blood pressure regularly outside of the clinic? Yes     Are you following a low salt diet? Yes    Are your blood pressures ever more than 140 on the top number (systolic) OR more   than 90 on the bottom number (diastolic), for example 140/90? No    COPD Follow-Up    Overall, how are your COPD symptoms since your last clinic visit?  No change    How much fatigue or shortness of breath do you have when you are walking?  None    How much shortness of breath do you have when you are resting?  Same as usual    How often do you cough? Never    Have you noticed any change in your sputum/phlegm?  No    Have you experienced a recent fever? No    Please describe how far you can walk without stopping to rest:  1-2 miles    How many flights of stairs are you able to walk up without stopping?  3 or more    Have you had any Emergency Room Visits, Urgent Care Visits, or Hospital Admissions because of your COPD since your last office visit?  No    History   Smoking Status     Former Smoker     Packs/day: 0.50     Years: 10.00     Types: Cigarettes   Smokeless Tobacco     Former User     Quit date: 5/1/2000     Comment: 0.5 ppd prior to 5/2000     No results found for: FEV1, [id number]      Reviewed and updated as needed this visit by clinical staff  Tobacco  Allergies  Meds   Med Hx  Surg Hx  Fam Hx  Soc Hx        Reviewed and updated as needed this visit by Provider  Tobacco  Allergies  Meds   Med Hx  Surg Hx  Fam Hx  Soc Hx       Social History     Tobacco Use     Smoking status: Former Smoker     Packs/day: 0.50     Years: 10.00     Pack years: 5.00     Types: Cigarettes      "Smokeless tobacco: Former User     Quit date: 5/1/2000     Tobacco comment: 0.5 ppd prior to 5/2000   Substance Use Topics     Alcohol use: No                           Current providers sharing in care for this patient include:   Patient Care Team:  Martha Brunson MD as PCP - General  Martha Brunson MD as Assigned PCP    The following health maintenance items are reviewed in Epic and correct as of today:  Health Maintenance   Topic Date Due     HEPATITIS C SCREENING  1950     ADVANCE CARE PLANNING  1950     ZOSTER IMMUNIZATION (1 of 2) 11/22/2002     Pneumococcal Vaccine: 65+ Years (1 of 1 - PPSV23) 11/22/2017     FALL RISK ASSESSMENT  12/11/2019     PHQ-2  01/01/2020     INFLUENZA VACCINE (1) 09/01/2020     MEDICARE ANNUAL WELLNESS VISIT  10/31/2020     COLORECTAL CANCER SCREENING  12/15/2022     LIPID  12/11/2023     DTAP/TDAP/TD IMMUNIZATION (3 - Td) 07/29/2025     SPIROMETRY  Completed     COPD ACTION PLAN  Completed     AORTIC ANEURYSM SCREENING (SYSTEM ASSIGNED)  Completed     Pneumococcal Vaccine: Pediatrics (0 to 5 Years) and At-Risk Patients (6 to 64 Years)  Aged Out     IPV IMMUNIZATION  Aged Out     MENINGITIS IMMUNIZATION  Aged Out     HEPATITIS B IMMUNIZATION  Aged Out     BP Readings from Last 3 Encounters:   10/14/20 134/68   10/31/19 136/76   02/05/19 138/76    Wt Readings from Last 3 Encounters:   10/14/20 99.3 kg (219 lb)   10/31/19 106.2 kg (234 lb 3.2 oz)   12/11/18 105.2 kg (232 lb)                  Pneumonia Vaccine: declined    ROS:  10 point ROS of systems including Constitutional, Eyes, Respiratory, Cardiovascular, Gastroenterology, Genitourinary, Integumentary, Muscularskeletal, Psychiatric were all negative except for pertinent positives noted in my HPI.      OBJECTIVE:   /68 (BP Location: Left arm, Patient Position: Chair, Cuff Size: Adult Regular)   Pulse 71   Temp 97.3  F (36.3  C) (Tympanic)   Resp 16   Ht 1.81 m (5' 11.25\")   Wt 99.3 kg (219 lb)   SpO2 " "96%   BMI 30.33 kg/m   Estimated body mass index is 30.33 kg/m  as calculated from the following:    Height as of this encounter: 1.81 m (5' 11.25\").    Weight as of this encounter: 99.3 kg (219 lb).     EXAM:   GENERAL: healthy, alert and no distress  EYES: Eyes grossly normal to inspection, PERRL and conjunctivae and sclerae normal  HENT: ear canals and TM's normal, nose and mouth without ulcers or lesions  NECK: no adenopathy, no asymmetry, masses, or scars and thyroid normal to palpation  RESP: lungs clear to auscultation - no rales, rhonchi or wheezes  CV: regular rate and rhythm, normal S1 S2, no S3 or S4, no murmur, click or rub, no peripheral edema and peripheral pulses strong  ABDOMEN: soft, nontender, no hepatosplenomegaly, no masses and bowel sounds normal  RECTAL: followed by specialist, declined exam here  MS: no gross musculoskeletal defects noted, no edema  SKIN: no suspicious lesions or rashes  NEURO: Normal strength and tone, mentation intact and speech normal  PSYCH: mentation appears normal, affect normal/bright    Diagnostic Test Results:  Labs reviewed in Epic      ASSESSMENT / PLAN:   1. Encounter for Medicare annual wellness exam  Screening and preventative care discussed.  Has follow-up for prostate cancer screening through his urologist    2. Hypertension goal BP (blood pressure) < 140/90  Adequate control of blood pressure noted currently.  Await lab results and refill of medication can be sent when results are available  - Albumin Random Urine Quantitative with Creat Ratio  - Comprehensive metabolic panel  - Lipid panel reflex to direct LDL Fasting  3.  Pulmonary emphysema   Symptoms absent currently.  No ongoing smoke exposure.  consider Spirometry for follow up in future.     Patient has been advised of split billing requirements and indicates understanding: Yes    COUNSELING:  Reviewed preventive health counseling, as reflected in patient instructions       Regular exercise       " "Healthy diet/nutrition       Vision screening       Dental care       Bladder control       Fall risk prevention       Immunizations    Declined: Influenza, Pneumococcal and Zoster due to Concerns about side effects/safety               Colon cancer screening       Prostate cancer screening       Osteoporosis Prevention/Bone Health    Estimated body mass index is 32.44 kg/m  as calculated from the following:    Height as of 10/31/19: 1.81 m (5' 11.25\").    Weight as of 10/31/19: 106.2 kg (234 lb 3.2 oz).    Weight management plan: Discussed healthy diet and exercise guidelines  Wt Readings from Last 5 Encounters:   10/14/20 99.3 kg (219 lb)   10/31/19 106.2 kg (234 lb 3.2 oz)   12/11/18 105.2 kg (232 lb)   10/17/17 105.3 kg (232 lb 3.2 oz)   11/14/16 109.2 kg (240 lb 12.8 oz)     He reports that he has quit smoking. His smoking use included cigarettes. He has a 5.00 pack-year smoking history. He quit smokeless tobacco use about 20 years ago.    Appropriate preventive services were discussed with this patient, including applicable screening as appropriate for cardiovascular disease, diabetes, osteopenia/osteoporosis, and glaucoma.  As appropriate for age/gender, discussed screening for colorectal cancer, prostate cancer, breast cancer, and cervical cancer. Checklist reviewing preventive services available has been given to the patient.    Reviewed patients plan of care and provided an AVS. The Basic Care Plan (routine screening as documented in Health Maintenance) for Cody meets the Care Plan requirement. This Care Plan has been established and reviewed with the Patient.    Counseling Resources:  ATP IV Guidelines  Pooled Cohorts Equation Calculator  Breast Cancer Risk Calculator  BRCA-Related Cancer Risk Assessment: FHS-7 Tool  FRAX Risk Assessment  ICSI Preventive Guidelines  Dietary Guidelines for Americans, 2010  USDA's MyPlate  ASA Prophylaxis  Lung CA Screening    Martha Brunson MD  [facility] " LUCIO MOLINA      Patient Instructions     Fasting labs today.  Refill of medication will be sent when results return.          This chart was documented by provider using a voice activated software called Dragon in addition to manual typing. There may be vocabulary errors or other grammatical errors due to this.

## 2020-10-14 NOTE — RESULT ENCOUNTER NOTE
Your blood sugar, kidney function, potassium, and liver testing are all normal.  Your cholesterol is under good control and improved from previous testing.  Refill of your lisinopril has been sent to the pharmacy for you now.  Please call or MyChart message me if you have any questions.    PSK

## 2020-12-06 ENCOUNTER — HEALTH MAINTENANCE LETTER (OUTPATIENT)
Age: 70
End: 2020-12-06

## 2021-09-25 ENCOUNTER — HEALTH MAINTENANCE LETTER (OUTPATIENT)
Age: 71
End: 2021-09-25

## 2021-10-27 DIAGNOSIS — I10 HYPERTENSION GOAL BP (BLOOD PRESSURE) < 140/90: ICD-10-CM

## 2021-10-27 RX ORDER — LISINOPRIL 10 MG/1
10 TABLET ORAL DAILY
Qty: 90 TABLET | Refills: 3 | Status: SHIPPED | OUTPATIENT
Start: 2021-10-27 | End: 2022-10-18

## 2021-10-27 NOTE — TELEPHONE ENCOUNTER
"Requested Prescriptions   Pending Prescriptions Disp Refills    lisinopril (ZESTRIL) 10 MG tablet 90 tablet 3     Sig: Take 1 tablet (10 mg) by mouth daily        ACE Inhibitors (Including Combos) Protocol Failed - 10/27/2021 11:34 AM        Failed - Blood pressure under 140/90 in past 12 months       BP Readings from Last 3 Encounters:   10/14/20 134/68   10/31/19 136/76   02/05/19 138/76                 Failed - Recent (12 mo) or future (30 days) visit within the authorizing provider's specialty     Patient has had an office visit with the authorizing provider or a provider within the authorizing providers department within the previous 12 mos or has a future within next 30 days. See \"Patient Info\" tab in inbasket, or \"Choose Columns\" in Meds & Orders section of the refill encounter.              Failed - Normal serum creatinine on file in past 12 months     Recent Labs   Lab Test 10/14/20  0738   CR 0.98       Ok to refill medication if creatinine is low          Failed - Normal serum potassium on file in past 12 months     Recent Labs   Lab Test 10/14/20  0738   POTASSIUM 4.4             Passed - Medication is active on med list        Passed - Patient is age 18 or older              "

## 2021-11-20 ENCOUNTER — HEALTH MAINTENANCE LETTER (OUTPATIENT)
Age: 71
End: 2021-11-20

## 2022-01-13 ENCOUNTER — LAB (OUTPATIENT)
Dept: LAB | Facility: CLINIC | Age: 72
End: 2022-01-13
Attending: FAMILY MEDICINE
Payer: COMMERCIAL

## 2022-01-13 DIAGNOSIS — Z20.828 EXPOSURE TO VIRAL DISEASE: ICD-10-CM

## 2022-01-13 PROCEDURE — 86769 SARS-COV-2 COVID-19 ANTIBODY: CPT | Mod: 90

## 2022-01-13 PROCEDURE — 99000 SPECIMEN HANDLING OFFICE-LAB: CPT

## 2022-01-13 PROCEDURE — 36415 COLL VENOUS BLD VENIPUNCTURE: CPT

## 2022-01-15 LAB
SARS-COV-2 AB SERPL IA-ACNC: >250 U/ML
SARS-COV-2 AB SERPL QL IA: POSITIVE

## 2022-10-04 ENCOUNTER — TELEPHONE (OUTPATIENT)
Dept: FAMILY MEDICINE | Facility: CLINIC | Age: 72
End: 2022-10-04

## 2022-10-04 DIAGNOSIS — M72.2 PLANTAR FASCIAL FIBROMATOSIS: Primary | ICD-10-CM

## 2022-10-04 NOTE — TELEPHONE ENCOUNTER
Please do the followin.  Notify patient that I will send the referral but he will want to verify with his insurance that they are in network prior to completing appointment.    2.  Fax the order for PHYSICAL THERAPY entered in this encounter to # in message below.      ROSASK

## 2022-10-04 NOTE — TELEPHONE ENCOUNTER
Reason for Call: Request for an order or referral:    Order or referral being requested: wants to meet with physical therapist mendy peoples at  [facility], she fitted him for orthotics, needs to have orthotics reviewed.     Fax# [phone]    Call patient once orders are placed     Date needed: as soon as possible    Has the patient been seen by the PCP for this problem? YES    Additional comments:     Phone number Patient can be reached at:  Cell number on file:    Telephone Information:   Mobile [phone]       Best Time:  anytime    Can we leave a detailed message on this number?  YES    Call taken on 10/4/2022 at 2:26 PM by Laina Webb

## 2022-10-05 NOTE — TELEPHONE ENCOUNTER
Writer spoke with patient and advised to contact insurance on coverage.     Faxed PT order to [phone]

## 2022-10-16 DIAGNOSIS — I10 HYPERTENSION GOAL BP (BLOOD PRESSURE) < 140/90: ICD-10-CM

## 2022-10-18 RX ORDER — LISINOPRIL 10 MG/1
10 TABLET ORAL DAILY
Qty: 30 TABLET | Refills: 0 | Status: SHIPPED | OUTPATIENT
Start: 2022-10-18

## 2022-11-03 ENCOUNTER — TELEPHONE (OUTPATIENT)
Dept: FAMILY MEDICINE | Facility: CLINIC | Age: 72
End: 2022-11-03

## 2023-01-07 ENCOUNTER — HEALTH MAINTENANCE LETTER (OUTPATIENT)
Age: 73
End: 2023-01-07

## 2023-06-08 ENCOUNTER — TRANSFERRED RECORDS (OUTPATIENT)
Dept: HEALTH INFORMATION MANAGEMENT | Facility: CLINIC | Age: 73
End: 2023-06-08

## 2023-06-08 LAB
ALT SERPL-CCNC: 30 U/L (ref 9–46)
AST SERPL-CCNC: 20 U/L (ref 10–35)
CHOLESTEROL (EXTERNAL): 149 MG/DL
CREATININE (EXTERNAL): 1.09 MG/DL (ref 0.7–1.28)
GFR ESTIMATED (EXTERNAL): 73 ML/MIN/1.73M2
GLUCOSE (EXTERNAL): 108 MG/DL (ref 65–99)
HBA1C MFR BLD: 5.4 %
HDLC SERPL-MCNC: 49 MG/DL
LDL CHOLESTEROL CALCULATED (EXTERNAL): 83 MG/DL
NON HDL CHOLESTEROL (EXTERNAL): 100 MG/DL
POTASSIUM (EXTERNAL): 4.5 MMOL/L (ref 3.5–5.3)
TRIGLYCERIDES (EXTERNAL): 79 MG/DL

## 2023-09-08 ENCOUNTER — TELEPHONE (OUTPATIENT)
Dept: FAMILY MEDICINE | Facility: CLINIC | Age: 73
End: 2023-09-08
Payer: COMMERCIAL

## 2023-09-08 NOTE — TELEPHONE ENCOUNTER
Forms/Letter Request    Type of form/letter:  [employer]    Have you been seen for this request: N/A    Do we have the form/letter: Yes: Will place on providers desk for review    When is form/letter needed by: N/A    How would you like the form/letter returned:  Patient sent for providers review

## 2023-10-19 ENCOUNTER — ANCILLARY PROCEDURE (OUTPATIENT)
Dept: GENERAL RADIOLOGY | Facility: CLINIC | Age: 73
End: 2023-10-19
Attending: FAMILY MEDICINE
Payer: COMMERCIAL

## 2023-10-19 ENCOUNTER — OFFICE VISIT (OUTPATIENT)
Dept: FAMILY MEDICINE | Facility: CLINIC | Age: 73
End: 2023-10-19
Payer: COMMERCIAL

## 2023-10-19 VITALS
WEIGHT: 226 LBS | DIASTOLIC BLOOD PRESSURE: 81 MMHG | TEMPERATURE: 97.8 F | RESPIRATION RATE: 16 BRPM | HEIGHT: 71 IN | HEART RATE: 74 BPM | SYSTOLIC BLOOD PRESSURE: 132 MMHG | OXYGEN SATURATION: 95 % | BODY MASS INDEX: 31.64 KG/M2

## 2023-10-19 DIAGNOSIS — I10 HYPERTENSION GOAL BP (BLOOD PRESSURE) < 140/90: ICD-10-CM

## 2023-10-19 DIAGNOSIS — Z00.00 ENCOUNTER FOR MEDICARE ANNUAL WELLNESS EXAM: Primary | ICD-10-CM

## 2023-10-19 DIAGNOSIS — M25.551 HIP PAIN, RIGHT: ICD-10-CM

## 2023-10-19 DIAGNOSIS — J43.9 PULMONARY EMPHYSEMA, UNSPECIFIED EMPHYSEMA TYPE (H): ICD-10-CM

## 2023-10-19 DIAGNOSIS — Z12.11 SCREEN FOR COLON CANCER: ICD-10-CM

## 2023-10-19 PROCEDURE — 73502 X-RAY EXAM HIP UNI 2-3 VIEWS: CPT | Mod: TC | Performed by: RADIOLOGY

## 2023-10-19 PROCEDURE — 99203 OFFICE O/P NEW LOW 30 MIN: CPT | Mod: 25 | Performed by: FAMILY MEDICINE

## 2023-10-19 PROCEDURE — G0439 PPPS, SUBSEQ VISIT: HCPCS | Performed by: FAMILY MEDICINE

## 2023-10-19 RX ORDER — RESPIRATORY SYNCYTIAL VIRUS VACCINE 120MCG/0.5
0.5 KIT INTRAMUSCULAR ONCE
Qty: 1 EACH | Refills: 0 | Status: CANCELLED | OUTPATIENT
Start: 2023-10-19 | End: 2023-10-19

## 2023-10-19 ASSESSMENT — ENCOUNTER SYMPTOMS
DIARRHEA: 0
FEVER: 0
DYSURIA: 0
ABDOMINAL PAIN: 0
PALPITATIONS: 0
FREQUENCY: 1
WEAKNESS: 0
JOINT SWELLING: 0
NAUSEA: 0
DIZZINESS: 0
HEMATOCHEZIA: 0
ARTHRALGIAS: 1
PARESTHESIAS: 0
CHILLS: 0
SHORTNESS OF BREATH: 0
CONSTIPATION: 0
MYALGIAS: 1
SORE THROAT: 0
HEMATURIA: 0
COUGH: 0
EYE PAIN: 0
HEARTBURN: 0
HEADACHES: 0

## 2023-10-19 ASSESSMENT — ACTIVITIES OF DAILY LIVING (ADL): CURRENT_FUNCTION: NO ASSISTANCE NEEDED

## 2023-10-19 ASSESSMENT — PAIN SCALES - GENERAL: PAINLEVEL: MODERATE PAIN (5)

## 2023-10-19 NOTE — PATIENT INSTRUCTIONS
Colonoscopy recommended   someone will call to set up appointment for this for you.    Resume PHYSICAL THERAPY exercises you have at home and notify me if you would desire to see them in person again.  I will send results with any additional recommendations when the xray result returns.            Patient Education   Personalized Prevention Plan  You are due for the preventive services outlined below.  Your care team is available to assist you in scheduling these services.  If you have already completed any of these items, please share that information with your care team to update in your medical record.  Health Maintenance Due   Topic Date Due    Pneumococcal Vaccine (1 - PCV) Never done    Zoster (Shingles) Vaccine (1 of 2) Never done    RSV VACCINE 60+ (1 - 1-dose 60+ series) Never done    Annual Wellness Visit  10/14/2021    Colorectal Cancer Screening  12/15/2022    Flu Vaccine (1) Never done    COVID-19 Vaccine (2 - 2023-24 season) 09/01/2023

## 2023-10-19 NOTE — PROGRESS NOTES
"SUBJECTIVE:   Cody is a 72 year old who presents for Preventive Visit.      10/19/2023     6:49 AM   Additional Questions   Roomed by Kayley REED   Accompanied by self         10/19/2023     6:49 AM   Patient Reported Additional Medications   Patient reports taking the following new medications None       Are you in the first 12 months of your Medicare coverage?  No    Healthy Habits:     In general, how would you rate your overall health?  Good    Frequency of exercise:  2-3 days/week    Duration of exercise:  15-30 minutes    Do you usually eat at least 4 servings of fruit and vegetables a day, include whole grains    & fiber and avoid regularly eating high fat or \"junk\" foods?  Yes    Taking medications regularly:  Yes    Medication side effects:  None    Ability to successfully perform activities of daily living:  No assistance needed    Home Safety:  No safety concerns identified    Hearing Impairment:  No hearing concerns    In the past 6 months, have you been bothered by leaking of urine?  No    In general, how would you rate your overall mental or emotional health?  Good    Additional concerns today:  Yes    Walking daily.  Lifetime 2/week     Today's PHQ-2 Score:       10/19/2023     6:50 AM   PHQ-2 ( 1999 Pfizer)   Q1: Little interest or pleasure in doing things 0   Q2: Feeling down, depressed or hopeless 0   PHQ-2 Score 0   Q1: Little interest or pleasure in doing things Not at all   Q2: Feeling down, depressed or hopeless Not at all   PHQ-2 Score 0           Have you ever done Advance Care Planning? (For example, a Health Directive, POLST, or a discussion with a medical provider or your loved ones about your wishes): No, advance care planning information given to patient to review.  Patient declined advance care planning discussion at this time.       Fall risk  Fallen 2 or more times in the past year?: No  Any fall with injury in the past year?: No    Cognitive Screening   1) Repeat 3 items (Leader, " Season, Table)    2) Clock draw: NORMAL  3) 3 item recall: Recalls 3 objects  Results: NORMAL clock, 1-2 items recalled: COGNITIVE IMPAIRMENT LESS LIKELY    Mini-CogTM Copyright MOE Yee. Licensed by the author for use in [facility]; reprinted with permission (kavin@[facility]). All rights reserved.      Do you have sleep apnea, excessive snoring or daytime drowsiness? : yes    Reviewed and updated as needed this visit by clinical staff   Tobacco  Allergies  Meds   Med Hx  Surg Hx  Fam Hx          Reviewed and updated as needed this visit by Provider   Tobacco  Allergies  Meds   Med Hx  Surg Hx  Fam Hx         Social History     Tobacco Use    Smoking status: Former     Packs/day: 0.50     Years: 10.00     Additional pack years: 0.00     Total pack years: 5.00     Types: Cigarettes    Smokeless tobacco: Former     Quit date: 5/1/2000    Tobacco comments:     0.5 ppd prior to 5/2000   Substance Use Topics    Alcohol use: No             10/19/2023     6:49 AM   Alcohol Use   Prescreen: >3 drinks/day or >7 drinks/week? Not Applicable     Do you have a current opioid prescription? No  Do you use any other controlled substances or medications that are not prescribed by a provider? None          Hypertension Follow-up    Do you check your blood pressure regularly outside of the clinic? Yes   Are you following a low salt diet? Yes  Are your blood pressures ever more than 140 on the top number (systolic) OR more   than 90 on the bottom number (diastolic), for example 140/90? No    COPD Follow-Up  Overall, how are your COPD symptoms since your last clinic visit?  No change  How much fatigue or shortness of breath do you have when you are walking?  None  How much shortness of breath do you have when you are resting?  Same as usual  How often do you cough? Never  Have you noticed any change in your sputum/phlegm?  No  Have you experienced a recent fever? No  Please describe how far you can walk without  stopping to rest:  1-2 miles  How many flights of stairs are you able to walk up without stopping?  3 or more  Have you had any Emergency Room Visits, Urgent Care Visits, or Hospital Admissions because of your COPD since your last office visit?  No  Current providers sharing in care for this patient include:   Patient Care Team:  Martha Brunson MD as PCP - General  Martha Brunson MD as Assigned PCP    The following health maintenance items are reviewed in Epic and correct as of today:  Health Maintenance   Topic Date Due    ANNUAL REVIEW OF  ORDERS  Never done    Pneumococcal Vaccine: 65+ Years (1 - PCV) Never done    HEPATITIS C SCREENING  Never done    ZOSTER IMMUNIZATION (1 of 2) Never done    RSV VACCINE 60+ (1 - 1-dose 60+ series) Never done    MEDICARE ANNUAL WELLNESS VISIT  10/14/2021    COLORECTAL CANCER SCREENING  12/15/2022    INFLUENZA VACCINE (1) Never done    COVID-19 Vaccine (2 - 2023-24 season) 09/01/2023    FALL RISK ASSESSMENT  10/19/2024    DTAP/TDAP/TD IMMUNIZATION (2 - Td or Tdap) 07/29/2025    LIPID  10/14/2025    ADVANCE CARE PLANNING  10/19/2028    SPIROMETRY  Completed    COPD ACTION PLAN  Completed    PHQ-2 (once per calendar year)  Completed    AORTIC ANEURYSM SCREENING (SYSTEM ASSIGNED)  Completed    IPV IMMUNIZATION  Aged Out    HPV IMMUNIZATION  Aged Out    MENINGITIS IMMUNIZATION  Aged Out     BP Readings from Last 3 Encounters:   10/19/23 132/81   10/14/20 134/68   10/31/19 136/76    Wt Readings from Last 3 Encounters:   10/19/23 102.5 kg (226 lb)   10/14/20 99.3 kg (219 lb)   10/31/19 106.2 kg (234 lb 3.2 oz)                          Review of Systems   Constitutional:  Negative for chills and fever.   HENT:  Negative for congestion, ear pain, hearing loss and sore throat.    Eyes:  Negative for pain and visual disturbance.   Respiratory:  Negative for cough and shortness of breath.    Cardiovascular:  Negative for chest pain, palpitations and peripheral edema.  "  Gastrointestinal:  Negative for abdominal pain, constipation, diarrhea, heartburn, hematochezia and nausea.   Genitourinary:  Positive for frequency and impotence. Negative for dysuria, genital sores, hematuria, penile discharge and urgency.   Musculoskeletal:  Positive for arthralgias and myalgias. Negative for joint swelling.   Skin:  Negative for rash.   Neurological:  Negative for dizziness, weakness, headaches and paresthesias.   Psychiatric/Behavioral:  Negative for mood changes.      2-3 times nocturia.  Seeing functional medicine doctor - tried flomax and cialis without benefit    Right hip/groin/inner thigh pain - Walking up hills right groin, feels like giving out. Painful.  Did see PHYSICAL THERAPY.  Better after aleve use.      OBJECTIVE:   /81 (BP Location: Right arm, Patient Position: Right side, Cuff Size: Adult Large)   Pulse 74   Temp 97.8  F (36.6  C) (Oral)   Resp 16   Ht 1.791 m (5' 10.5\")   Wt 102.5 kg (226 lb)   SpO2 95%   BMI 31.97 kg/m   Estimated body mass index is 31.97 kg/m  as calculated from the following:    Height as of this encounter: 1.791 m (5' 10.5\").    Weight as of this encounter: 102.5 kg (226 lb).  Physical Exam  GENERAL: alert, no distress, and obese  EYES: Eyes grossly normal to inspection, PERRL and conjunctivae and sclerae normal  HENT: ear canals and TM's normal, nose and mouth without ulcers or lesions  NECK: no adenopathy, no asymmetry, masses, or scars and thyroid normal to palpation  RESP: lungs clear to auscultation - no rales, rhonchi or wheezes  CV: regular rate and rhythm, normal S1 S2, no S3 or S4, no murmur, click or rub, no peripheral edema and peripheral pulses strong  ABDOMEN: soft, nontender, no hepatosplenomegaly, no masses and bowel sounds normal  MS: FROM and normal exam all extremities with exception of RLE exam shows normal strength and muscle mass, no deformities, no erythema, induration, or nodules, no evidence of joint effusion, ROM of " all joints is normal, no evidence of joint instability, and adductor and anterior thigh muscles with mild tenderness.    SKIN: no suspicious lesions or rashes  NEURO: Normal strength and tone, mentation intact and speech normal  PSYCH: mentation appears normal, affect normal/bright    Diagnostic Test Results:  Labs reviewed in Epic    Hip xray - narrowing of the joint space inferiorly noted.      ASSESSMENT / PLAN:   (Z00.00) Encounter for Medicare annual wellness exam  (primary encounter diagnosis)  Comment: labs performed previously.    Plan: screening and preventative care discussed.      (M25.551) Hip pain, right  Comment: groin pain  Plan: XR Hip Right 2-3 Views        Mild arthritis changes noted.  Awaiting final xray reading.  Has some PHYSICAL THERAPY exercises for home use but has not been doing these.  Encouraged to resume these and follow up if not effective.  If desires in person PHYSICAL THERAPY will notify me and referral can be given.     (I10) Hypertension goal BP (blood pressure) < 140/90  Comment: controlled.   Plan: continue Lisinopril as previous.     (Z12.11) Screen for colon cancer  Comment:   Plan: Colonoscopy Screening  Referral            Pulmonary emphysema  Symptoms absent currently.  No ongoing smoke exposure.  consider Spirometry for follow up in future.        Patient has been advised of split billing requirements and indicates understanding: Yes      COUNSELING:  Reviewed preventive health counseling, as reflected in patient instructions       Regular exercise       Healthy diet/nutrition       Vision screening       Hearing screening       Dental care       Bladder control       Fall risk prevention       Immunizations  Declined: Covid-19, Influenza, Pneumococcal, and Zoster due to Concerns about side effects/safety             Colon cancer screening       Prostate cancer screening      BMI:   Estimated body mass index is 31.97 kg/m  as calculated from the following:     "Height as of this encounter: 1.791 m (5' 10.5\").    Weight as of this encounter: 102.5 kg (226 lb).   Weight management plan: Discussed healthy diet and exercise guidelines      He reports that he has quit smoking. His smoking use included cigarettes. He has a 5.00 pack-year smoking history. He quit smokeless tobacco use about 23 years ago.      Appropriate preventive services were discussed with this patient, including applicable screening as appropriate for fall prevention, nutrition, physical activity, Tobacco-use cessation, weight loss and cognition.  Checklist reviewing preventive services available has been given to the patient.    Reviewed patients plan of care and provided an AVS. The Basic Care Plan (routine screening as documented in Health Maintenance) for Cody meets the Care Plan requirement. This Care Plan has been established and reviewed with the Patient.          Martha Brunson MD  [facility]    Identified Health Risks:  I have reviewed Opioid Use Disorder and Substance Use Disorder risk factors and made any needed referrals.             Patient Instructions   Colonoscopy recommended   someone will call to set up appointment for this for you.    Resume PHYSICAL THERAPY exercises you have at home and notify me if you would desire to see them in person again.  I will send results with any additional recommendations when the xray result returns.            Patient Education  Personalized Prevention Plan  You are due for the preventive services outlined below.  Your care team is available to assist you in scheduling these services.  If you have already completed any of these items, please share that information with your care team to update in your medical record.  Health Maintenance Due   Topic Date Due    Pneumococcal Vaccine (1 - PCV) Never done    Zoster (Shingles) Vaccine (1 of 2) Never done    RSV VACCINE 60+ (1 - 1-dose 60+ series) Never done    Annual Wellness Visit  " 10/14/2021    Colorectal Cancer Screening  12/15/2022    Flu Vaccine (1) Never done    COVID-19 Vaccine (2 - 2023-24 season) 09/01/2023

## 2023-10-25 ENCOUNTER — TELEPHONE (OUTPATIENT)
Dept: GASTROENTEROLOGY | Facility: CLINIC | Age: 73
End: 2023-10-25

## 2023-10-25 ENCOUNTER — TELEPHONE (OUTPATIENT)
Dept: GASTROENTEROLOGY | Facility: CLINIC | Age: 73
End: 2023-10-25
Payer: COMMERCIAL

## 2023-10-25 DIAGNOSIS — Z12.11 SCREEN FOR COLON CANCER: Primary | ICD-10-CM

## 2023-10-25 RX ORDER — BISACODYL 5 MG/1
TABLET, DELAYED RELEASE ORAL
Qty: 4 TABLET | Refills: 0 | Status: SHIPPED | OUTPATIENT
Start: 2023-10-25

## 2023-10-25 NOTE — TELEPHONE ENCOUNTER
Attempted to contact patient in order to complete pre assessment questions.     No answer. Left message to return call to [phone] option 4      Procedure details:    Patient scheduled for Colonoscopy  on 11.06.2023.     Arrival time: 0915. Procedure time 1000    Pre op exam needed? N/A    Facility location: [facility]; [address], [place] [address]    Sedation type: Conscious sedation     Indication for procedure: Screen for colon cancer       Chart review:     Electronic implanted devices? No    Diabetic? No    Diabetic medication HOLDING recommendations: (if applicable)  Oral diabetic medications: N/A  Diabetic injectables: N/A  Insulin: N/A      Medication review:    Anticoagulants? No    NSAIDS? No NSAID medications per patient's medication list.  RN will verify with pre-assessment call.    Other medication HOLDING recommendations:  N/A      Prep for procedure:     Bowel prep recommendation: Extended Golytely   Due to: poor prep/inadequate bowel prep in the past.     Prep instructions sent via [email or web address]. Bowel prep script sent to [facility]/PHARMACY #[id number] [place], [place] - [address] [facility]      Lyn Wharton RN  Endoscopy Procedure Pre Assessment RN

## 2023-10-25 NOTE — TELEPHONE ENCOUNTER
Pre assessment completed for upcoming procedure.   (Please see previous telephone encounter notes for complete details)    Patient  returned call.       Procedure details:    Arrival time and facility location reviewed.    Pre op exam needed? N/A    Designated  policy reviewed. Instructed to have someone stay 6 hours post procedure.     COVID policy reviewed.      Medication review:    Medications reviewed. Please see supporting documentation below. Holding recommendations discussed (if applicable).       Prep for procedure:     Procedure prep instructions reviewed.        Additional information needed?  N/A      Patient  verbalized understanding and had no questions or concerns at this time.      Stephanie Gabriel RN  Endoscopy Procedure Pre Assessment RN  [phone] option 4

## 2023-10-25 NOTE — TELEPHONE ENCOUNTER
"Endoscopy Scheduling Screen    Have you had a positive Covid test in the last 14 days?  No    Are you active on MyChart?   Yes    What insurance is in the chart?  Other:  [employer] medicare    Ordering/Referring Provider:     THEODORE KHAN      (If ordering provider performs procedure, schedule with ordering provider unless otherwise instructed. )    BMI: Estimated body mass index is 31.97 kg/m  as calculated from the following:    Height as of 10/19/23: 1.791 m (5' 10.5\").    Weight as of 10/19/23: 102.5 kg (226 lb).     Sedation Ordered  moderate sedation.   If patient BMI > 50 do not schedule in [facility].    If patient BMI > 45 do not schedule at [facility].    Are you taking methadone or Suboxone?  No    Are you taking any prescription medications for pain 3 or more times per week?   No    Do you have a history of malignant hyperthermia or adverse reaction to anesthesia?  No    (Females) Are you currently pregnant?   No     Have you been diagnosed or told you have pulmonary hypertension?   No    Do you have an LVAD?  No    Have you been told you have moderate to severe sleep apnea?  No    Have you been told you have COPD, asthma, or any other lung disease?  No    Do you have any heart conditions?  No     Have you ever had an organ transplant?   No    Have you ever had or are you awaiting a heart or lung transplant?   No    Have you had a stroke or transient ischemic attack (TIA aka \"mini stroke\" in the last 6 months?   No    Have you been diagnosed with or been told you have cirrhosis of the liver?   No    Are you currently on dialysis?   No    Do you need assistance transferring?   No    BMI: Estimated body mass index is 31.97 kg/m  as calculated from the following:    Height as of 10/19/23: 1.791 m (5' 10.5\").    Weight as of 10/19/23: 102.5 kg (226 lb).     Is patients BMI > 40 and scheduling location [facility]?  No    Do you take an injectable medication for weight loss or diabetes (excluding insulin)?  No    Do you take the " medication Naltrexone?  No    Do you take blood thinners?  No       Prep   Are you currently on dialysis or do you have chronic kidney disease?  No    Do you have a diagnosis of diabetes?  No    Do you have a diagnosis of cystic fibrosis (CF)?  No    On a regular basis do you go 3 -5 days between bowel movements?  No    BMI > 40?  No    Preferred Pharmacy:    [facility]/pharmacy #[id number] [facility] [address] [facility]  [address] [address]  Phone: [phone] Fax: [phone]      Final Scheduling Details   Colonoscopy prep sent?  Standard MiraLAX    Procedure scheduled  Colonoscopy    Surgeon:  Kye     Date of procedure:  11/6     Pre-OP / PAC:   No - Not required for this site.    Location  [facility] - [facility] - Per order.    Sedation   Moderate Sedation - Per order.      Patient Reminders:   You will receive a call from a Nurse to review instructions and health history.  This assessment must be completed prior to your procedure.  Failure to complete the Nurse assessment may result in the procedure being cancelled.      On the day of your procedure, please designate an adult(s) who can drive you home stay with you for the next 24 hours. The medicines used in the exam will make you sleepy. You will not be able to drive.      You cannot take public transportation, ride share services, or non-medical taxi service without a responsible caregiver.  Medical transport services are allowed with the requirement that a responsible caregiver will receive you at your destination.  We require that drivers and caregivers are confirmed prior to your procedure.

## 2023-11-06 ENCOUNTER — HOSPITAL ENCOUNTER (OUTPATIENT)
Facility: AMBULATORY SURGERY CENTER | Age: 73
Discharge: HOME | End: 2023-11-06
Attending: INTERNAL MEDICINE | Admitting: INTERNAL MEDICINE
Payer: COMMERCIAL

## 2023-11-06 VITALS
HEART RATE: 75 BPM | DIASTOLIC BLOOD PRESSURE: 81 MMHG | TEMPERATURE: 98.7 F | SYSTOLIC BLOOD PRESSURE: 144 MMHG | RESPIRATION RATE: 16 BRPM | OXYGEN SATURATION: 97 %

## 2023-11-06 LAB — COLONOSCOPY: NORMAL

## 2023-11-06 PROCEDURE — 88305 TISSUE EXAM BY PATHOLOGIST: CPT | Performed by: STUDENT IN AN ORGANIZED HEALTH CARE EDUCATION/TRAINING PROGRAM

## 2023-11-06 PROCEDURE — G8907 PT DOC NO EVENTS ON DISCHARG: HCPCS

## 2023-11-06 PROCEDURE — G8918 PT W/O PREOP ORDER IV AB PRO: HCPCS

## 2023-11-06 PROCEDURE — 45385 COLONOSCOPY W/LESION REMOVAL: CPT

## 2023-11-06 RX ORDER — ONDANSETRON 4 MG/1
4 TABLET, ORALLY DISINTEGRATING ORAL EVERY 6 HOURS PRN
Status: DISCONTINUED | OUTPATIENT
Start: 2023-11-06 | End: 2023-11-07 | Stop reason: HOSPADM

## 2023-11-06 RX ORDER — ONDANSETRON 2 MG/ML
4 INJECTION INTRAMUSCULAR; INTRAVENOUS EVERY 6 HOURS PRN
Status: DISCONTINUED | OUTPATIENT
Start: 2023-11-06 | End: 2023-11-07 | Stop reason: HOSPADM

## 2023-11-06 RX ORDER — PROCHLORPERAZINE MALEATE 5 MG
5 TABLET ORAL EVERY 6 HOURS PRN
Status: DISCONTINUED | OUTPATIENT
Start: 2023-11-06 | End: 2023-11-07 | Stop reason: HOSPADM

## 2023-11-06 RX ORDER — NALOXONE HYDROCHLORIDE 0.4 MG/ML
0.4 INJECTION, SOLUTION INTRAMUSCULAR; INTRAVENOUS; SUBCUTANEOUS
Status: DISCONTINUED | OUTPATIENT
Start: 2023-11-06 | End: 2023-11-07 | Stop reason: HOSPADM

## 2023-11-06 RX ORDER — NALOXONE HYDROCHLORIDE 0.4 MG/ML
0.2 INJECTION, SOLUTION INTRAMUSCULAR; INTRAVENOUS; SUBCUTANEOUS
Status: DISCONTINUED | OUTPATIENT
Start: 2023-11-06 | End: 2023-11-07 | Stop reason: HOSPADM

## 2023-11-06 RX ORDER — ONDANSETRON 2 MG/ML
4 INJECTION INTRAMUSCULAR; INTRAVENOUS
Status: DISCONTINUED | OUTPATIENT
Start: 2023-11-06 | End: 2023-11-07 | Stop reason: HOSPADM

## 2023-11-06 RX ORDER — LIDOCAINE 40 MG/G
CREAM TOPICAL
Status: DISCONTINUED | OUTPATIENT
Start: 2023-11-06 | End: 2023-11-07 | Stop reason: HOSPADM

## 2023-11-06 RX ORDER — FLUMAZENIL 0.1 MG/ML
0.2 INJECTION, SOLUTION INTRAVENOUS
Status: ACTIVE | OUTPATIENT
Start: 2023-11-06 | End: 2023-11-06

## 2023-11-06 RX ORDER — FENTANYL CITRATE 50 UG/ML
INJECTION, SOLUTION INTRAMUSCULAR; INTRAVENOUS PRN
Status: DISCONTINUED | OUTPATIENT
Start: 2023-11-06 | End: 2023-11-06 | Stop reason: HOSPADM

## 2023-11-06 NOTE — H&P
[facility] Pre-op History and Physical    Cody Jose MRN# [id number]   Age: 72 year old YOB: 1950            Date of Exam 11/6/2023         Primary care provider: Martha Brunson         Chief Complaint and/or Reason for Procedure:     History of colon polyps       Active problem list:     Patient Active Problem List    Diagnosis Date Noted    Elevated fasting blood sugar 10/17/2017     Priority: Medium    moderate obstructive lung (H) 10/17/2017     Priority: Medium    Non morbid obesity due to excess calories 11/16/2016     Priority: Medium    Hypertension goal BP (blood pressure) < 140/90 10/21/2014     Priority: Medium    Hypotestosteronism      Priority: Medium    CARDIOVASCULAR SCREENING; LDL GOAL LESS THAN 130 10/31/2010     Priority: Medium    Encounter for removal of sutures 10/31/2008     Priority: Medium     (Problem list name updated by automated process. Provider to review and confirm.)      Urinary hesitancy 09/23/2008     Priority: Medium    Skin lesion 09/23/2008     Priority: Medium     (Problem list name updated by automated process. Provider to review and confirm.)      Numbness of toes 09/23/2008     Priority: Medium    Mechanical low back pain 09/23/2008     Priority: Medium    Allergy to shellfish 09/23/2008     Priority: Medium    Hearing loss 09/23/2008     Priority: Medium     Problem list name updated by automated process. Provider to review      Hypertrophy of prostate without urinary obstruction      Priority: Medium     Problem list name updated by automated process. Provider to review              Medications (include herbals and vitamins):   Any Plavix use in the last 7 days? No     Current Outpatient Medications   Medication Sig    bisacodyl (DULCOLAX) 5 MG EC tablet 2 days prior to procedure, take 2 tablets at 4 pm. 1 day prior to procedure, take 2 tablets at 4 pm. For additional instructions refer to your colonoscopy prep instructions.     "polyethylene glycol (GOLYTELY) 236 g suspension 2 days prior at 5pm, mix and drink half of a jug of Golytely. Drink an 8 oz. glass of Golytely every 15 minutes until half of the jug is gone. Place remainder of Golytely in the refrigerator. 1 day prior at 5 pm, drink the 2nd half of a jug of Golytely bowel prep. 6 hours before your check-in time, drink an 8 oz. glass of Golytely every 15 minutes until half of the 2nd jug of Golytely is gone. Discard remainder of second jug.    TESTOSTERONE IM injection twice a week - 0.25 mL bid    B-D TB SYRINGE 27G X 1/2\" 1 ML MISC USE AS DIRECTED TWICE WEEKLY    lisinopril (ZESTRIL) 10 MG tablet Take 1 tablet (10 mg) by mouth daily +++ appointment needed for additional refills +++    MULTIVITAL OR daily     Current Facility-Administered Medications   Medication    lidocaine (LMX4) kit    lidocaine 1 % 0.1-1 mL    ondansetron (ZOFRAN) injection 4 mg    sodium chloride (PF) 0.9% PF flush 3 mL    sodium chloride (PF) 0.9% PF flush 3 mL             Allergies:      Allergies   Allergen Reactions    Shellfish Allergy Anaphylaxis     Allergy to Latex? No  Allergy to tape?   No  Intolerances:             Physical Exam:   All vitals have been reviewed  Patient Vitals for the past 8 hrs:   BP Temp Temp src Resp SpO2   11/06/23 0941 (!) 163/88 99.8  F (37.7  C) Temporal 18 96 %     No intake/output data recorded.  Lungs:   No increased work of breathing, good air exchange, clear to auscultation bilaterally, no crackles or wheezing     Cardiovascular:   normal S1 and S2             Lab / Radiology Results:            Anesthetic risk and/or ASA classification:     2  Teresa Fishman, DO      "

## 2023-11-08 LAB
PATH REPORT.COMMENTS IMP SPEC: NORMAL
PATH REPORT.COMMENTS IMP SPEC: NORMAL
PATH REPORT.FINAL DX SPEC: NORMAL
PATH REPORT.GROSS SPEC: NORMAL
PATH REPORT.MICROSCOPIC SPEC OTHER STN: NORMAL
PATH REPORT.RELEVANT HX SPEC: NORMAL
PHOTO IMAGE: NORMAL

## 2024-06-05 ENCOUNTER — TRANSFERRED RECORDS (OUTPATIENT)
Dept: HEALTH INFORMATION MANAGEMENT | Facility: CLINIC | Age: 74
End: 2024-06-05
Payer: COMMERCIAL

## 2024-07-25 ENCOUNTER — TELEPHONE (OUTPATIENT)
Dept: FAMILY MEDICINE | Facility: CLINIC | Age: 74
End: 2024-07-25
Payer: COMMERCIAL

## 2024-07-25 NOTE — TELEPHONE ENCOUNTER
Called Pt no answer left detailed message letting Pt know that Pt's insurance is allowing Pt to be seen earlier for upcoming Apt which can be virtual.       [facility]a SOSA Visit Facilitator

## 2024-10-08 ENCOUNTER — OFFICE VISIT (OUTPATIENT)
Dept: FAMILY MEDICINE | Facility: CLINIC | Age: 74
End: 2024-10-08
Payer: COMMERCIAL

## 2024-10-08 VITALS
DIASTOLIC BLOOD PRESSURE: 82 MMHG | HEIGHT: 71 IN | BODY MASS INDEX: 32.65 KG/M2 | SYSTOLIC BLOOD PRESSURE: 136 MMHG | HEART RATE: 89 BPM | RESPIRATION RATE: 16 BRPM | OXYGEN SATURATION: 95 % | TEMPERATURE: 97.9 F | WEIGHT: 233.2 LBS

## 2024-10-08 DIAGNOSIS — Z01.818 PREOPERATIVE EXAMINATION: Primary | ICD-10-CM

## 2024-10-08 DIAGNOSIS — M25.551 HIP PAIN, RIGHT: ICD-10-CM

## 2024-10-08 DIAGNOSIS — I10 HYPERTENSION GOAL BP (BLOOD PRESSURE) < 140/90: ICD-10-CM

## 2024-10-08 PROBLEM — J43.9 PULMONARY EMPHYSEMA, UNSPECIFIED EMPHYSEMA TYPE (H): Status: RESOLVED | Noted: 2017-10-17 | Resolved: 2024-10-08

## 2024-10-08 LAB
ERYTHROCYTE [DISTWIDTH] IN BLOOD BY AUTOMATED COUNT: 15 % (ref 10–15)
HCT VFR BLD AUTO: 48.4 % (ref 40–53)
HGB BLD-MCNC: 16.6 G/DL (ref 13.3–17.7)
MCH RBC QN AUTO: 30.2 PG (ref 26.5–33)
MCHC RBC AUTO-ENTMCNC: 34.3 G/DL (ref 31.5–36.5)
MCV RBC AUTO: 88 FL (ref 78–100)
PLATELET # BLD AUTO: 204 10E3/UL (ref 150–450)
RBC # BLD AUTO: 5.49 10E6/UL (ref 4.4–5.9)
WBC # BLD AUTO: 7.4 10E3/UL (ref 4–11)

## 2024-10-08 PROCEDURE — 93000 ELECTROCARDIOGRAM COMPLETE: CPT | Performed by: NURSE PRACTITIONER

## 2024-10-08 PROCEDURE — 99214 OFFICE O/P EST MOD 30 MIN: CPT | Performed by: NURSE PRACTITIONER

## 2024-10-08 PROCEDURE — 85027 COMPLETE CBC AUTOMATED: CPT | Performed by: NURSE PRACTITIONER

## 2024-10-08 PROCEDURE — 80048 BASIC METABOLIC PNL TOTAL CA: CPT | Performed by: NURSE PRACTITIONER

## 2024-10-08 PROCEDURE — 36415 COLL VENOUS BLD VENIPUNCTURE: CPT | Performed by: NURSE PRACTITIONER

## 2024-10-08 NOTE — PROGRESS NOTES
Preoperative Evaluation  [address] [address]  Phone: [phone]  Primary Provider: Martha Brunson MD  Pre-op Performing Provider: BETTINA Luo CNP  Oct 8, 2024         10/8/2024   Surgical Information   What procedure is being done? Right Hip   Facility or Hospital where procedure/surgery will be performed: [facility]   Who is doing the procedure / surgery? Dr James Chavez   Date of surgery / procedure: 10/25/2024   Time of surgery / procedure: TBD   Where do you plan to recover after surgery? at home with family        Fax number for surgical facility: [phone]    Assessment & Plan     The proposed surgical procedure is considered INTERMEDIATE risk.    Preoperative examination  Hip pain, right  Cleared for surgery without further workup needed or concerns.   Cody verbalizes understanding of plan of care and is in agreement.    - BASIC METABOLIC PANEL  - EKG 12-lead complete w/read - Clinics  - CBC with platelets  - BASIC METABOLIC PANEL  - CBC with platelets      Hypertension goal BP (blood pressure) < 140/90    - BASIC METABOLIC PANEL  - CBC with platelets  - BASIC METABOLIC PANEL  - CBC with platelets              - No identified additional risk factors other than previously addressed    Antiplatelet or Anticoagulation Medication Instructions   - Patient is on no antiplatelet or anticoagulation medications.    Additional Medication Instructions  Take all scheduled medications on the day of surgery EXCEPT for modifications listed below:   - ACE/ARB: DO NOT TAKE on day of surgery (minimum 11 hours for general anesthesia).   - Herbal medications and vitamins: DO NOT TAKE 14 days prior to surgery.    Recommendation  Approval given to proceed with proposed procedure, without further diagnostic evaluation.    Cherie Ross is a 73 year old, presenting for the following:  Pre-Op Exam          10/8/2024     3:42 PM   Additional  Questions   Roomed by Flori CMA   Accompanied by Self     HPI related to upcoming procedure: right hip OA        10/8/2024   Pre-Op Questionnaire   Have you ever had a heart attack or stroke? No   Have you ever had surgery on your heart or blood vessels, such as a stent placement, a coronary artery bypass, or surgery on an artery in your head, neck, heart, or legs? No   Do you have chest pain with activity? No   Do you have a history of heart failure? No   Do you currently have a cold, bronchitis or symptoms of other infection? No   Do you have a cough, shortness of breath, or wheezing? No   Do you or anyone in your family have previous history of blood clots? No   Do you or does anyone in your family have a serious bleeding problem such as prolonged bleeding following surgeries or cuts? No   Have you ever had problems with anemia or been told to take iron pills? No   Have you had any abnormal blood loss such as black, tarry or bloody stools? No   Have you ever had a blood transfusion? No   Are you willing to have a blood transfusion if it is medically needed before, during, or after your surgery? YES   Have you or any of your relatives ever had problems with anesthesia? No   Do you have sleep apnea, excessive snoring or daytime drowsiness? No   Do you have any artifical heart valves or other implanted medical devices like a pacemaker, defibrillator, or continuous glucose monitor? No   Do you have artificial joints? No   Are you allergic to latex? No        Health Care Directive  Patient does not have a Health Care Directive or Living Will: Patient states has Advance Directive and will bring in a copy to clinic.    Preoperative Review of    reviewed - controlled substances reflected in medication list.      Status of Chronic Conditions:  See problem list for active medical problems.  Problems all longstanding and stable, except as noted/documented.  See ROS for pertinent symptoms related to these  conditions.    Patient Active Problem List    Diagnosis Date Noted    Elevated fasting blood sugar 10/17/2017     Priority: Medium    Non morbid obesity due to excess calories 11/16/2016     Priority: Medium    Hypertension goal BP (blood pressure) < 140/90 10/21/2014     Priority: Medium    Hypotestosteronism      Priority: Medium    CARDIOVASCULAR SCREENING; LDL GOAL LESS THAN 130 10/31/2010     Priority: Medium    Encounter for removal of sutures 10/31/2008     Priority: Medium     (Problem list name updated by automated process. Provider to review and confirm.)      Urinary hesitancy 09/23/2008     Priority: Medium    Skin lesion 09/23/2008     Priority: Medium     (Problem list name updated by automated process. Provider to review and confirm.)      Numbness of toes 09/23/2008     Priority: Medium    Mechanical low back pain 09/23/2008     Priority: Medium    Allergy to shellfish 09/23/2008     Priority: Medium    Hearing loss 09/23/2008     Priority: Medium     Problem list name updated by automated process. Provider to review      Hypertrophy of prostate without urinary obstruction      Priority: Medium     Problem list name updated by automated process. Provider to review        Past Medical History:   Diagnosis Date    Benign neoplasm of colon     Due for colonoscopy in 2009    CARDIOVASCULAR SCREENING; LDL GOAL LESS THAN 130 10/31/2010    Ganglion, unspecified     ganglion cyst left ankle     Hypertension goal BP (blood pressure) < 140/90 10/21/2014    Hypertrophy of prostate without urinary obstruction and other lower urinary tract symptoms (LUTS)     Hypotestosteronism     Psychosexual dysfunction with inhibited sexual excitement     Tobacco use disorder     smoked 10 years age [age]     Past Surgical History:   Procedure Laterality Date    COLONOSCOPY  10/11/2007    COLONOSCOPY N/A 11/6/2023    Procedure: COLONOSCOPY, FLEXIBLE, WITH LESION REMOVAL USING SNARE;  Surgeon: Teresa Fishman DO;  Location:  "[facility] OR    COLONOSCOPY WITH CO2 INSUFFLATION N/A 12/15/2017    Procedure: COLONOSCOPY WITH CO2 INSUFFLATION;  Screen for colon cancer  BMI: 32.32  [phone]  Ref: Dr. Brunson  Ins: [employer];  Surgeon: Roman Stein MD;  Location: [facility] OR    COLONOSCOPY WITH CO2 INSUFFLATION N/A 2023    Procedure: Colonoscopy with CO2 insufflation;  Surgeon: Teresa Fishman DO;  Location: [facility] OR    ZZC APPENDECTOMY  9 yrs old    ZZC NONSPECIFIC PROCEDURE  2005    Ganglion Cyst Removal - Left Ankle     Current Outpatient Medications   Medication Sig Dispense Refill    B-D TB SYRINGE 27G X 1/2\" 1 ML MISC USE AS DIRECTED TWICE WEEKLY  1    lisinopril (ZESTRIL) 10 MG tablet Take 1 tablet (10 mg) by mouth daily +++ appointment needed for additional refills +++ 30 tablet 0    MULTIVITAL OR daily      TESTOSTERONE IM injection twice a week - 0.25 mL bid         Allergies   Allergen Reactions    Shellfish Allergy Anaphylaxis        Social History     Tobacco Use    Smoking status: Former     Current packs/day: 0.00     Average packs/day: 0.5 packs/day for 5.0 years (2.5 ttl pk-yrs)     Types: Cigarettes     Start date:      Quit date: 2000     Years since quittin.7    Smokeless tobacco: Former     Quit date: 2000    Tobacco comments:     0.5 ppd prior to 2000   Substance Use Topics    Alcohol use: No     Family History   Problem Relation Age of Onset    Cerebrovascular Disease Mother         aneursym    Cancer Mother         lung, smoker    Alcohol/Drug Father         etoh, tob    Hypertension Father     Prostate Cancer Father     Respiratory Father         COPD    Cardiovascular Father         carotid artery surgery- stint placed 6 years ago    C.A.D. Maternal Grandmother     Cancer Maternal Grandfather         lung    Cerebrovascular Disease Paternal Grandmother     C.A.D. Paternal Grandfather     Neurologic Disorder Brother         hydrocephalus     History   Drug Use No       " "      Review of Systems  Constitutional, neuro, ENT, endocrine, pulmonary, cardiac, gastrointestinal, genitourinary, musculoskeletal, integument and psychiatric systems are negative, except as otherwise noted.    Objective    /82   Pulse 89   Temp 97.9  F (36.6  C) (Tympanic)   Resp 16   Ht 1.791 m (5' 10.5\")   Wt 105.8 kg (233 lb 3.2 oz)   SpO2 95%   BMI 32.99 kg/m     Estimated body mass index is 32.99 kg/m  as calculated from the following:    Height as of this encounter: 1.791 m (5' 10.5\").    Weight as of this encounter: 105.8 kg (233 lb 3.2 oz).  Physical Exam  GENERAL: alert and no distress  EYES: Eyes grossly normal to inspection, PERRL and conjunctivae and sclerae normal  HENT: ear canals and TM's normal, nose and mouth without ulcers or lesions  NECK: no adenopathy, no asymmetry, masses, or scars  RESP: lungs clear to auscultation - no rales, rhonchi or wheezes  CV: regular rate and rhythm, normal S1 S2, no S3 or S4, no murmur, click or rub, no peripheral edema  ABDOMEN: soft, nontender, no hepatosplenomegaly, no masses and bowel sounds normal  MS: no gross musculoskeletal defects noted, no edema  SKIN: no suspicious lesions or rashes  NEURO: Normal strength and tone, mentation intact and speech normal  PSYCH: mentation appears normal, affect normal/bright    No results for input(s): \"HGB\", \"PLT\", \"INR\", \"NA\", \"POTASSIUM\", \"CR\", \"A1C\" in the last 8760 hours.     Diagnostics  Results for orders placed or performed in visit on 10/08/24   BASIC METABOLIC PANEL     Status: Normal   Result Value Ref Range    Sodium 137 135 - 145 mmol/L    Potassium 4.3 3.4 - 5.3 mmol/L    Chloride 104 98 - 107 mmol/L    Carbon Dioxide (CO2) 22 22 - 29 mmol/L    Anion Gap 11 7 - 15 mmol/L    Urea Nitrogen 20.2 8.0 - 23.0 mg/dL    Creatinine 1.03 0.67 - 1.17 mg/dL    GFR Estimate 78 >60 mL/min/1.73m2    Calcium 9.1 8.8 - 10.4 mg/dL    Glucose 97 70 - 99 mg/dL   CBC with platelets     Status: Normal   Result Value Ref " Range    WBC Count 7.4 4.0 - 11.0 10e3/uL    RBC Count 5.49 4.40 - 5.90 10e6/uL    Hemoglobin 16.6 13.3 - 17.7 g/dL    Hematocrit 48.4 40.0 - 53.0 %    MCV 88 78 - 100 fL    MCH 30.2 26.5 - 33.0 pg    MCHC 34.3 31.5 - 36.5 g/dL    RDW 15.0 10.0 - 15.0 %    Platelet Count 204 150 - 450 10e3/uL         EKG: appears normal, NSR, normal axis, normal intervals, no acute ST/T changes c/w ischemia, no LVH by voltage criteria, unchanged from previous tracings    Revised Cardiac Risk Index (RCRI)  The patient has the following serious cardiovascular risks for perioperative complications:   - No serious cardiac risks = 0 points     RCRI Interpretation: 0 points: Class I (very low risk - 0.4% complication rate)         Signed Electronically by: BETTINA Luo CNP  A copy of this evaluation report is provided to the requesting physician.

## 2024-10-09 ENCOUNTER — PATIENT OUTREACH (OUTPATIENT)
Dept: CARE COORDINATION | Facility: CLINIC | Age: 74
End: 2024-10-09
Payer: COMMERCIAL

## 2024-10-09 LAB
ANION GAP SERPL CALCULATED.3IONS-SCNC: 11 MMOL/L (ref 7–15)
BUN SERPL-MCNC: 20.2 MG/DL (ref 8–23)
CALCIUM SERPL-MCNC: 9.1 MG/DL (ref 8.8–10.4)
CHLORIDE SERPL-SCNC: 104 MMOL/L (ref 98–107)
CREAT SERPL-MCNC: 1.03 MG/DL (ref 0.67–1.17)
EGFRCR SERPLBLD CKD-EPI 2021: 78 ML/MIN/1.73M2
GLUCOSE SERPL-MCNC: 97 MG/DL (ref 70–99)
HCO3 SERPL-SCNC: 22 MMOL/L (ref 22–29)
POTASSIUM SERPL-SCNC: 4.3 MMOL/L (ref 3.4–5.3)
SODIUM SERPL-SCNC: 137 MMOL/L (ref 135–145)

## 2024-10-09 NOTE — PATIENT INSTRUCTIONS
How to Take Your Medication Before Surgery  Preoperative Medication Instructions   Antiplatelet or Anticoagulation Medication Instructions   - Patient is on no antiplatelet or anticoagulation medications.    Additional Medication Instructions  Take all scheduled medications on the day of surgery EXCEPT for modifications listed below:   - ACE/ARB: DO NOT TAKE on day of surgery (minimum 11 hours for general anesthesia).   - Herbal medications and vitamins: DO NOT TAKE 14 days prior to surgery.       Patient Education   Preparing for Your Surgery  For Adults  Getting started  In most cases, a nurse will call to review your health history and instructions. They will give you an arrival time based on your scheduled surgery time. Please be ready to share:  Your doctor's clinic name and phone number  Your medical, surgical, and anesthesia history  A list of allergies and sensitivities  A list of medicines, including herbal treatments and over-the-counter drugs  Whether the patient has a legal guardian (ask how to send us the papers in advance)  Note: You may not receive a call if you were seen at our PAC (Preoperative Assessment Center).  Please tell us if you're pregnant--or if there's any chance you might be pregnant. Some surgeries may injure a fetus (unborn baby), so they require a pregnancy test. Surgeries that are safe for a fetus don't always need a test, and you can choose whether to have one.   Preparing for surgery  Within 10 to 30 days of surgery: Have a pre-op exam (sometimes called an H&P, or History and Physical). This can be done at a clinic or pre-operative center.  If you're having a , you may not need this exam. Talk to your care team.  At your pre-op exam, talk to your care team about all medicines you take. (This includes CBD oil and any drugs, such as THC, marijuana, and other forms of cannabis.) If you need to stop any medicine before surgery, ask when to start taking it again.  This is for  your safety. Many medicines and drugs can make you bleed too much during surgery. Some change how well surgery (anesthesia) drugs work.  Call your insurance company to let them know you're having surgery. (If you don't have insurance, call [phone].)  Call your clinic if there's any change in your health. This includes a scrape or scratch near the surgery site, or any signs of a cold (sore throat, runny nose, cough, rash, fever).  Eating and drinking guidelines  For your safety: Unless your surgeon tells you otherwise, follow the guidelines below.  Eat and drink as normal until 8 hours before you arrive for surgery. After that, no food or milk. You can spit out gum when you arrive.  Drink clear liquids until 2 hours before you arrive. These are liquids you can see through, like water, Gatorade, and Propel Water. They also include plain black coffee and tea (no cream or milk).  No alcohol for 24 hours before you arrive. The night before surgery, stop any drinks that contain THC.  If your care team tells you to take medicine on the morning of surgery, it's okay to take it with a sip of water. No other medicines or drugs are allowed (including CBD oil)--follow your care team's instructions.  If you have questions the day of surgery, call your hospital or surgery center.   Preventing infection  Shower or bathe the night before and the morning of surgery. Follow the instructions your clinic gave you. (If no instructions, use regular soap.)  Don't shave or clip hair near your surgery site. We'll remove the hair if needed.  Don't smoke or vape the morning of surgery. No chewing tobacco for 6 hours before you arrive. A nicotine patch is okay. You may spit out nicotine gum when you arrive.  For some surgeries, the surgeon will tell you to fully quit smoking and nicotine.  We will make every effort to keep you safe from infection. We will:  Clean our hands often with soap and water (or an alcohol-based hand rub).  Clean  the skin at your surgery site with a special soap that kills germs.  Give you a special gown to keep you warm. (Cold raises the risk of infection.)  Wear hair covers, masks, gowns, and gloves during surgery.  Give antibiotic medicine, if prescribed. Not all surgeries need this medicine.  What to bring on the day of surgery  Photo ID and insurance card  Copy of your health care directive, if you have one  Glasses and hearing aids (bring cases)  You can't wear contacts during surgery  Inhaler and eye drops, if you use them (tell us about these when you arrive)  CPAP machine or breathing device, if you use them  A few personal items, if spending the night  If you have . . .  A pacemaker, ICD (cardiac defibrillator), or other implant: Bring the ID card.  An implanted stimulator: Bring the remote control.  A legal guardian: Bring a copy of the certified (court-stamped) guardianship papers.  Please remove any jewelry, including body piercings. Leave jewelry and other valuables at home.  If you're going home the day of surgery  You must have a responsible adult drive you home. They should stay with you overnight as well.  If you don't have someone to stay with you, and you aren't safe to go home alone, we may keep you overnight. Insurance often won't pay for this.  After surgery  If it's hard to control your pain or you need more pain medicine, please call your surgeon's office.  Questions?   If you have any questions for your care team, list them here:   ____________________________________________________________________________________________________________________________________________________________________________________________________________________________________________________________  For informational purposes only. Not to replace the advice of your health care provider. Copyright   2003, 2019 [facility]. All rights reserved. Clinically reviewed by Ronnie Prather MD. [employer] [id number]  - REV 08/24.

## 2024-10-11 NOTE — RESULT ENCOUNTER NOTE
Dear Cody,    Here is a summary of your recent test results:    -All of your labs are normal. Good luck with surgery.     For additional lab test information, labtestsonline.org is an excellent reference.    In addition, here is a list of due or overdue Health Maintenance reminders:    Pneumococcal Vaccine(1 of 2 - PCV) Never done  Zoster (Shingles) Vaccine(1 of 2) Never done  RSV VACCINE(1 - Risk 60-74 years 1-dose series) Never done  Annual Wellness Visit due on 10/19/2024    Please call us at [phone] (or use UserMojo) to address the above recommendations if needed.    Thank you for choosing [facility].  It was an honor and a privilege to participate in your care.       Healthy regards,    Jia Nicholson, MARISSA  [facility]

## 2024-10-22 ENCOUNTER — TRANSFERRED RECORDS (OUTPATIENT)
Dept: HEALTH INFORMATION MANAGEMENT | Facility: CLINIC | Age: 74
End: 2024-10-22
Payer: COMMERCIAL

## 2024-10-23 ENCOUNTER — PATIENT OUTREACH (OUTPATIENT)
Dept: CARE COORDINATION | Facility: CLINIC | Age: 74
End: 2024-10-23
Payer: COMMERCIAL

## 2024-11-07 ENCOUNTER — TRANSFERRED RECORDS (OUTPATIENT)
Dept: HEALTH INFORMATION MANAGEMENT | Facility: CLINIC | Age: 74
End: 2024-11-07
Payer: COMMERCIAL

## 2024-12-04 ENCOUNTER — TRANSFERRED RECORDS (OUTPATIENT)
Dept: HEALTH INFORMATION MANAGEMENT | Facility: CLINIC | Age: 74
End: 2024-12-04
Payer: COMMERCIAL

## 2024-12-30 ENCOUNTER — TELEPHONE (OUTPATIENT)
Dept: FAMILY MEDICINE | Facility: CLINIC | Age: 74
End: 2024-12-30
Payer: COMMERCIAL

## 2024-12-30 NOTE — TELEPHONE ENCOUNTER
Patient Quality Outreach    Patient is due for the following:   Physical Annual Wellness Visit      Topic Date Due    Pneumococcal Vaccine (1 of 1 - PCV) Never done    Zoster (Shingles) Vaccine (1 of 2) Never done     Hepatitis C screening     Action(s) Taken:   SCHEDULE ANNUAL WELLNESS APPOINTMENT    Type of outreach:    Sent GENWI message.    Questions for provider review:    None           Lesley Price MA

## 2025-01-05 ENCOUNTER — HEALTH MAINTENANCE LETTER (OUTPATIENT)
Age: 75
End: 2025-01-05

## 2025-04-30 ENCOUNTER — TRANSFERRED RECORDS (OUTPATIENT)
Dept: HEALTH INFORMATION MANAGEMENT | Facility: CLINIC | Age: 75
End: 2025-04-30
Payer: COMMERCIAL

## (undated) DEVICE — PAD CHUX UNDERPAD 23X24" 7136

## (undated) DEVICE — KIT ENDO FIRST STEP DISINFECTANT 200ML W/POUCH EP-4

## (undated) DEVICE — SOL WATER IRRIG 1000ML BOTTLE 07139-09

## (undated) DEVICE — PREP CHLORAPREP 26ML TINTED ORANGE  260815

## (undated) RX ORDER — SIMETHICONE 40MG/0.6ML
SUSPENSION, DROPS(FINAL DOSAGE FORM)(ML) ORAL
Status: DISPENSED
Start: 2017-12-15

## (undated) RX ORDER — FENTANYL CITRATE 50 UG/ML
INJECTION, SOLUTION INTRAMUSCULAR; INTRAVENOUS
Status: DISPENSED
Start: 2017-12-15

## (undated) RX ORDER — FENTANYL CITRATE 50 UG/ML
INJECTION, SOLUTION INTRAMUSCULAR; INTRAVENOUS
Status: DISPENSED
Start: 2023-11-06